# Patient Record
Sex: FEMALE | Race: WHITE | Employment: OTHER | URBAN - METROPOLITAN AREA
[De-identification: names, ages, dates, MRNs, and addresses within clinical notes are randomized per-mention and may not be internally consistent; named-entity substitution may affect disease eponyms.]

---

## 2017-03-30 ENCOUNTER — OFFICE VISIT (OUTPATIENT)
Dept: NEUROLOGY | Facility: CLINIC | Age: 42
End: 2017-03-30

## 2017-03-30 VITALS
WEIGHT: 146.2 LBS | SYSTOLIC BLOOD PRESSURE: 122 MMHG | BODY MASS INDEX: 25.91 KG/M2 | HEIGHT: 63 IN | HEART RATE: 82 BPM | DIASTOLIC BLOOD PRESSURE: 85 MMHG

## 2017-03-30 DIAGNOSIS — G40.909 RECURRENT SEIZURES (H): Primary | ICD-10-CM

## 2017-03-30 NOTE — MR AVS SNAPSHOT
After Visit Summary   3/30/2017    Mary Veliz    MRN: [id number]           Patient Information     Date Of Birth          1975        Visit Information        Provider Department      3/30/2017 10:00 AM Bill Munoz MD [facility]        Today's Diagnoses     Recurrent seizures (H)    -  1       Follow-ups after your visit        Follow-up notes from your care team     Return in about 1 year (around 3/30/2018).      Who to contact     Please call your clinic at [phone] to:    Ask questions about your health    Make or cancel appointments    Discuss your medicines    Learn about your test results    Speak to your doctor   If you have compliments or concerns about an experience at your clinic, or if you wish to file a complaint, please contact [facility] Patient Relations at [phone] or email us at [email or web address]         Additional Information About Your Visit        MyChart Information     [employer] is an electronic gateway that provides easy, online access to your medical records. With [employer], you can request a clinic appointment, read your test results, renew a prescription or communicate with your care team.     To sign up for [employer] visit the website at [email or web address]   You will be asked to enter the access code listed below, as well as some personal information. Please follow the directions to create your username and password.     Your access code is: [email or web address]  Expires: 2017 12:06 PM     Your access code will  in 90 days. If you need help or a new code, please contact your [facility] or call [phone] for assistance.        Care EveryWhere ID     This is your Care EveryWhere ID. This could be used by other organizations to access your [facility] medical records  [id number]        Your Vitals Were     Pulse Height Breastfeeding? BMI (Body Mass Index)          82 5'  "2.5\" (158.8 cm) No 26.31 kg/m2         Blood Pressure from Last 3 Encounters:   03/30/17 122/85   12/30/16 147/85   11/09/16 111/79    Weight from Last 3 Encounters:   03/30/17 146 lb 3.2 oz (66.3 kg)   12/30/16 146 lb (66.2 kg)              We Performed the Following     Levetiracetam level        Primary Care Provider Office Phone # Fax #    [facility] [phone]       [facility] [address] [place] [address]        Thank you!     Thank you for choosing [facility]  for your care. Our goal is always to provide you with excellent care. Hearing back from our patients is one way we can continue to improve our services. Please take a few minutes to complete the written survey that you may receive in the mail after your visit with us. Thank you!             Your Updated Medication List - Protect others around you: Learn how to safely use, store and throw away your medicines at www.disposemymeds.org.          This list is accurate as of: 3/30/17 12:06 PM.  Always use your most recent med list.                   Brand Name Dispense Instructions for use    BABY ASPIRIN PO      Take 1 capsule by mouth daily       HumaLOG KWIKpen 100 UNIT/ML injection   Generic drug:  insulin lispro      Inject  Subcutaneous 3 times daily (before meals).       insulin glargine 100 UNIT/ML injection    LANTUS     Inject  Subcutaneous At Bedtime.       levETIRAcetam 250 MG tablet    KEPPRA    60 tablet    Take 1 tablet (250 mg) by mouth 2 times daily       TYLENOL PO      Take by mouth as needed for mild pain or fever         "

## 2017-03-30 NOTE — PROGRESS NOTES
"INTERVAL HX: No more spells. Tolerating 250 bid of LEV well, though dizzy first few days.       HISTORY OF PRESENT ILLNESS:  The most recent spell occurred on 11/08.  She was in a store shopping with friends.  She stopped outside of the store and began to feel that she was talking in slow motion and that her friends were also talking in slow motion.  She sat down, feeling dizzy; friends drove her home.  When she arrived at home, she started having some unusual spells where she began to shake her left hand, and she demonstrated this in clinic as a rapid back and forth movement of the hand.  She then began to clap hands, arms were twisting.  She was fully aware of these events and she said \"I couldn't stop them and they scared me.\"  This went on and off for about an hour or so before the paramedics arrived.  She did check her glucose which was 452 at the time.  EMS gave her some sedation, but that did not appear to change her symptoms.  Do not know what the sedation was.  In the emergency room, she was noted to have a normal blood pressure and intermittent \"tremors of her extremities.\"  She was able to stop these when performing purposeful movements in the emergency room.  Ativan was given and she had no further events after that.  She returned home and did have vomiting and then tiredness for the next day or 2.      Emergency room or laboratories confirmed glucose that was elevated at 339.  CT of the brain without contrast was negative.      In exploring her history, she states that when she was approximately 27, she began to have spells approximately once every year or 2.  Often these began with nausea, dizziness and she sometimes ends up on the ground.  Two years ago she had an event where she ended up on the ground and did have urinary incontinence.      The spell she had on 11/08, however, was somewhat different in as much as she did not lose awareness.  There are no precipitating events for these.  These have " never been diagnosed as epileptic seizures, but Dr. Steinberg did raise that possibility.  There is no family history of epilepsy.      Mother confirmed birth weight of approximately 8 pounds 3 ounces.      Developmental was normal.  No febrile seizures.  No encephalitis or meningitis.  No severe head injuries.      She had a routine EEG at the [facility] that was done on 11/16/2016 as interpreted as borderline with presence of only asymmetric theta activity in the left temporal region of unclear clinical significance.      MRI scan on 11/23 at [facility] did not state the strength of the MRI machine, but was basically FLARE, DWI and other images and was read as subtle asymmetric decrease in the left hippocampal volume with associated intrinsic T2 prolongation, suspicious for left hippocampal sclerosis.  She is currently on no anti-seizure medication.      She had juvenile-onset diabetes at age 17, treated with Humalog and Lantus.  Her diabetes is very poorly controlled with she reports blood sugars as high as 700 without symptoms.  Apparently she has been deemed not a good candidate for an insulin pump, but [facility].      ALLERGIES:  Codeine which causes severe itching.      MEDICAL HISTORY:  Type 1 diabetes, onset age 17.      SURGERIES:  Included 2 C-sections with the birth of children, back surgery for spinal stenosis, tubal ligation, arthroscopic knee surgery, left knee.      FAMILY HISTORY:  Positive for cancer, heart disease in parents and hypertension and heart attacks in father.        Currently she is disabled due to cervical spinal stenosis, which has made it impossible for her to do any significant lifting, etc.      She previously did work in  in an office and in retail as well.      Two children ages 19 and 11.        She describes herself generally as a happy, pleasant person that, however, feels socially isolated.  Her  does  work.  She does go to [employer] on Sundays.  She has had a previous history of mild depression treated but currently does not describe any issues with depression.      REVIEW OF SYSTEMS:  Weakness of upper arms and wrists, spinal stenosis including paresthesias.      Currently not driving but often drives around the neighborhood a few blocks to go to [employer], shopping and visit parents.      Otherwise, cardiovascular, gastrointestinal and HEENT are negative.  She does have occasional anxiety and difficulty sleeping.      She is a smoker.      PHYSICAL EXAMINATION:   GENERAL:  On exam today, she is quite alert, speaks fluently and actually has a normal affect.   CHEST:  Examination of the lungs reveals some rales or rhonchi initially on deep breathing, which do clear.  NEUROLOGICAL EXAMINATION:  Memory is mildly impaired with 2 items out of 3 at 5 minutes.   CRANIAL NERVES:  Pupils are equal and react briskly.  Visual fields are grossly normal.  Facial movements are symmetrical.  Hearing is intact.  Tongue and palate move well.   CEREBELLAR EXAMINATION:  Finger-nose-finger is done without dysmetria.  Romberg is negative.  Tandem gait is done with only minimal incoordination.     MOTOR TONE AND STRENGTH:  Normal, though she does have some mild difficulty with hopping on either foot.     REFLEXES:  Deep tendon reflexes are hypoactive in the lower extremities.     SENSATION:  She does have loss of sensation to pin and sharp and dull.  Has decreased vibration sense, but position sense is grossly normal.      ASSESSMENT:  Electroencephalogram  done Nov 2016  shows relatively frequent sharp waves in the left temporal region, and support EEG from [facility]. Also MRI findings of L temp issues.   Will present at [facility] for 7 T MRI.   PLAN:  1)  LEV level  2) RTC 1 year

## 2017-03-30 NOTE — LETTER
"3/30/2017       RE: Mary Veliz  : 1975   MRN: [id number]      Dear Colleague,    Thank you for referring your patient, Mary Veliz, to the [facility] at [facility]. Please see a copy of my visit note below.    INTERVAL HX: No more spells. Tolerating 250 bid of LEV well, though dizzy first few days.       HISTORY OF PRESENT ILLNESS:  The most recent spell occurred on .  She was in a store shopping with friends.  She stopped outside of the store and began to feel that she was talking in slow motion and that her friends were also talking in slow motion.  She sat down, feeling dizzy; friends drove her home.  When she arrived at home, she started having some unusual spells where she began to shake her left hand, and she demonstrated this in clinic as a rapid back and forth movement of the hand.  She then began to clap hands, arms were twisting.  She was fully aware of these events and she said \"I couldn't stop them and they scared me.\"  This went on and off for about an hour or so before the paramedics arrived.  She did check her glucose which was 452 at the time.  EMS gave her some sedation, but that did not appear to change her symptoms.  Do not know what the sedation was.  In the emergency room, she was noted to have a normal blood pressure and intermittent \"tremors of her extremities.\"  She was able to stop these when performing purposeful movements in the emergency room.  Ativan was given and she had no further events after that.  She returned home and did have vomiting and then tiredness for the next day or 2.      Emergency room or laboratories confirmed glucose that was elevated at 339.  CT of the brain without contrast was negative.      In exploring her history, she states that when she was approximately 27, she began to have spells approximately once every year or 2.  Often these began with nausea, dizziness and she sometimes ends up on the " ground.  Two years ago she had an event where she ended up on the ground and did have urinary incontinence.      The spell she had on 11/08, however, was somewhat different in as much as she did not lose awareness.  There are no precipitating events for these.  These have never been diagnosed as epileptic seizures, but Dr. Steinberg did raise that possibility.  There is no family history of epilepsy.      Mother confirmed birth weight of approximately 8 pounds 3 ounces.      Developmental was normal.  No febrile seizures.  No encephalitis or meningitis.  No severe head injuries.      She had a routine EEG at the [facility] that was done on 11/16/2016 as interpreted as borderline with presence of only asymmetric theta activity in the left temporal region of unclear clinical significance.      MRI scan on 11/23 at [facility] did not state the strength of the MRI machine, but was basically FLARE, DWI and other images and was read as subtle asymmetric decrease in the left hippocampal volume with associated intrinsic T2 prolongation, suspicious for left hippocampal sclerosis.  She is currently on no anti-seizure medication.      She had juvenile-onset diabetes at age 17, treated with Humalog and Lantus.  Her diabetes is very poorly controlled with she reports blood sugars as high as 700 without symptoms.  Apparently she has been deemed not a good candidate for an insulin pump, but has been [facility] and [facility] by [facility].      ALLERGIES:  Codeine which causes severe itching.      MEDICAL HISTORY:  Type 1 diabetes, onset age 17.      SURGERIES:  Included 2 C-sections with the birth of children, back surgery for spinal stenosis, tubal ligation, arthroscopic knee surgery, left knee.      FAMILY HISTORY:  Positive for cancer, heart disease in parents and hypertension and heart attacks in father.        Currently she is disabled due to cervical spinal stenosis, which has made it impossible for her to  do any significant lifting, etc.      She previously did work in  in an office and in retail as well.      Two children ages 19 and 11.        She describes herself generally as a happy, pleasant person that, however, feels socially isolated.  Her  does work.  She does go to [employer] on Sundays.  She has had a previous history of mild depression treated but currently does not describe any issues with depression.      REVIEW OF SYSTEMS:  Weakness of upper arms and wrists, spinal stenosis including paresthesias.      Currently not driving but often drives around the neighborhood a few blocks to go to [employer], shopping and visit parents.      Otherwise, cardiovascular, gastrointestinal and HEENT are negative.  She does have occasional anxiety and difficulty sleeping.      She is a smoker.      PHYSICAL EXAMINATION:   GENERAL:  On exam today, she is quite alert, speaks fluently and actually has a normal affect.   CHEST:  Examination of the lungs reveals some rales or rhonchi initially on deep breathing, which do clear.  NEUROLOGICAL EXAMINATION:  Memory is mildly impaired with 2 items out of 3 at 5 minutes.   CRANIAL NERVES:  Pupils are equal and react briskly.  Visual fields are grossly normal.  Facial movements are symmetrical.  Hearing is intact.  Tongue and palate move well.   CEREBELLAR EXAMINATION:  Finger-nose-finger is done without dysmetria.  Romberg is negative.  Tandem gait is done with only minimal incoordination.     MOTOR TONE AND STRENGTH:  Normal, though she does have some mild difficulty with hopping on either foot.     REFLEXES:  Deep tendon reflexes are hypoactive in the lower extremities.     SENSATION:  She does have loss of sensation to pin and sharp and dull.  Has decreased vibration sense, but position sense is grossly normal.      ASSESSMENT:  Electroencephalogram  done Nov 2016  shows relatively frequent sharp waves in the left temporal region, and support EEG from [facility]. Also MRI findings of L temp issues.   Will present at [facility] for 7 T MRI.   PLAN:  1)  LEV level  2) RTC 1 year        Again, thank you for allowing me to participate in the care of your patient.      Sincerely,    Bill Munoz MD

## 2017-03-31 LAB — LEVETIRACETAM SERPL-MCNC: 8.8 UG/ML

## 2017-04-28 DIAGNOSIS — G40.909 RECURRENT SEIZURES (H): ICD-10-CM

## 2017-05-01 RX ORDER — LEVETIRACETAM 250 MG/1
TABLET ORAL
Qty: 60 TABLET | Refills: 11 | Status: SHIPPED | OUTPATIENT
Start: 2017-05-01 | End: 2018-02-23

## 2018-02-23 ENCOUNTER — OFFICE VISIT (OUTPATIENT)
Dept: NEUROLOGY | Facility: CLINIC | Age: 43
End: 2018-02-23
Payer: COMMERCIAL

## 2018-02-23 VITALS
SYSTOLIC BLOOD PRESSURE: 137 MMHG | HEIGHT: 63 IN | TEMPERATURE: 98.7 F | HEART RATE: 97 BPM | DIASTOLIC BLOOD PRESSURE: 83 MMHG | WEIGHT: 148 LBS | BODY MASS INDEX: 26.22 KG/M2

## 2018-02-23 DIAGNOSIS — G40.909 RECURRENT SEIZURES (H): ICD-10-CM

## 2018-02-23 ASSESSMENT — PAIN SCALES - GENERAL: PAINLEVEL: SEVERE PAIN (6)

## 2018-02-23 NOTE — LETTER
"2018       RE: Mary Veliz  : 1975   MRN: [id number]      Dear Colleague,    Thank you for referring your patient, Mary Veliz, to the [facility] at [facility]. Please see a copy of my visit note below.    INTERVAL HX: No more spells. Tolerating 250 bid of LEV well. Major issue continues to be diabetes, spinal stenosis, and dreary hoplessness due to medical and financial issues.   HISTORY OF PRESENT ILLNESS: REV 16. The most recent spell occurred on 16 She was in a store shopping with friends.  She stopped outside of the store and began to feel that she was talking in slow motion and that her friends were also talking in slow motion.  She sat down, feeling dizzy; friends drove her home.  When she arrived at home, she started having some unusual spells where she began to shake her left hand, and she demonstrated this in clinic as a rapid back and forth movement of the hand.  She then began to clap hands, arms were twisting.  She was fully aware of these events and she said \"I couldn't stop them and they scared me.\"  This went on and off for about an hour or so before the paramedics arrived.  She did check her glucose which was 452 at the time.  EMS gave her some sedation, but that did not appear to change her symptoms.    the emergency room, she was noted to have a normal blood pressure and intermittent \"tremors of her extremities.\"  She was able to stop these when performing purposeful movements in the emergency room.  Ativan was given and she had no further events after that.  She returned home and did have vomiting and then tiredness for the next day or 2.      Emergency room or laboratories confirmed glucose that was elevated at 339.  CT of the brain without contrast was negative.      In exploring her history, she states that when she was approximately 27, she began to have spells approximately once every year or 2.  Often these " began with nausea, dizziness and she sometimes ends up on the ground.  Two years ago she had an event where she ended up on the ground and did have urinary incontinence.      The spell she had on 11/08, however, was somewhat different in as much as she did not lose awareness.  There are no precipitating events for these.  These have never been diagnosed as epileptic seizures, but Dr. Steinberg did raise that possibility.  There is no family history of epilepsy.      Mother confirmed birth weight of approximately 8 pounds 3 ounces.      Developmental was normal.  No febrile seizures.  No encephalitis or meningitis.  No severe head injuries.      She had a routine EEG at the [facility] that was done on 11/16/2016 as interpreted as borderline with presence of only asymmetric theta activity in the left temporal region of unclear clinical significance.      MRI scan on 11/2316  at [facility] did not state the strength of the MRI machine, but was basically FLARE, DWI and other images and was read as subtle asymmetric decrease in the left hippocampal volume with associated intrinsic T2 prolongation, suspicious for left hippocampal sclerosis.  She is currently on no anti-seizure medication.      She had juvenile-onset diabetes at age 17, treated with Humalog and Lantus.  Her diabetes is very poorly controlled with she reports blood sugars as high as 700 without symptoms.  Apparently she has been deemed not a good candidate for an insulin pump, but has been [facility] and [facility] by [facility].      ALLERGIES:  Codeine which causes severe itching.      MEDICAL HISTORY:  Type 1 diabetes, onset age 17.      SURGERIES:  Included 2 C-sections with the birth of children, back surgery for spinal stenosis, tubal ligation, arthroscopic knee surgery, left knee.      FAMILY HISTORY:  Positive for cancer, heart disease in parents and hypertension and heart attacks in father.        Currently she is disabled due to  cervical spinal stenosis, which has made it impossible for her to do any significant lifting, etc.      She previously did work in  in an office and in retail as well.      Two children ages 19 and 11.        She describes herself generally as a happy, pleasant person that, however, feels socially isolated.  Her  does work.  She does go to [employer] on Sundays.  She has had a previous history of mild depression treated but currently does not describe any issues with depression.      REVIEW OF SYSTEMS:  Weakness of upper arms and wrists, spinal stenosis including paresthesias.      Currently not driving but often drives around the neighborhood a few blocks to go to [employer], shopping and visit parents.      Otherwise, cardiovascular, gastrointestinal and HEENT are negative.  She does have occasional anxiety and difficulty sleeping.      She is a smoker.      PHYSICAL EXAMINATION:   GENERAL:  On exam today, she is quite alert, speaks fluently and actually has a normal affect.   CHEST:  Examination of the lungs reveals some rales or rhonchi initially on deep breathing, which do clear.  NEUROLOGICAL EXAMINATION:  Memory is mildly impaired with 2 items out of 3 at 5 minutes.   CRANIAL NERVES:  Pupils are equal and react briskly.  Visual fields are grossly normal.  Facial movements are symmetrical.  Hearing is intact.  Tongue and palate move well.   CEREBELLAR EXAMINATION:  Finger-nose-finger is done without dysmetria.  Romberg is negative.  Tandem gait is done with only minimal incoordination.     MOTOR TONE AND STRENGTH:  Normal, though she does have some mild difficulty with hopping on either foot.     REFLEXES:  Deep tendon reflexes are hypoactive in the lower extremities.     SENSATION:  She does have loss of sensation to pin and sharp and dull.  Has decreased vibration sense, but position sense is grossly normal.      ASSESSMENT:  Electroencephalogram  done Nov 2016  shows relatively frequent sharp waves  in the left temporal region, and supports EEG from [facility]. Also MRI findings of L temp issues. DIagnosis is  Presumptive CP SZ but diff Dx includew diabetic events & PSNEs      PLAN:  1) Given phone # for [facility] transplant center  2) RTC 1 year      Sincerely,    Bill Munoz MD

## 2018-02-23 NOTE — PROGRESS NOTES
"INTERVAL HX: No more spells. Tolerating 250 bid of LEV well. Major issue continues to be diabetes, spinal stenosis, and dreary hoplessness due to medical and financial issues.   HISTORY OF PRESENT ILLNESS: REV 2/23/18. The most recent spell occurred on 11/08/16 She was in a store shopping with friends.  She stopped outside of the store and began to feel that she was talking in slow motion and that her friends were also talking in slow motion.  She sat down, feeling dizzy; friends drove her home.  When she arrived at home, she started having some unusual spells where she began to shake her left hand, and she demonstrated this in clinic as a rapid back and forth movement of the hand.  She then began to clap hands, arms were twisting.  She was fully aware of these events and she said \"I couldn't stop them and they scared me.\"  This went on and off for about an hour or so before the paramedics arrived.  She did check her glucose which was 452 at the time.  EMS gave her some sedation, but that did not appear to change her symptoms.    the emergency room, she was noted to have a normal blood pressure and intermittent \"tremors of her extremities.\"  She was able to stop these when performing purposeful movements in the emergency room.  Ativan was given and she had no further events after that.  She returned home and did have vomiting and then tiredness for the next day or 2.      Emergency room or laboratories confirmed glucose that was elevated at 339.  CT of the brain without contrast was negative.      In exploring her history, she states that when she was approximately 27, she began to have spells approximately once every year or 2.  Often these began with nausea, dizziness and she sometimes ends up on the ground.  Two years ago she had an event where she ended up on the ground and did have urinary incontinence.      The spell she had on 11/08, however, was somewhat different in as much as she did not lose awareness.  " There are no precipitating events for these.  These have never been diagnosed as epileptic seizures, but Dr. Steinberg did raise that possibility.  There is no family history of epilepsy.      Mother confirmed birth weight of approximately 8 pounds 3 ounces.      Developmental was normal.  No febrile seizures.  No encephalitis or meningitis.  No severe head injuries.      She had a routine EEG at the [facility] that was done on 11/16/2016 as interpreted as borderline with presence of only asymmetric theta activity in the left temporal region of unclear clinical significance.      MRI scan on 11/2316  at [facility] did not state the strength of the MRI machine, but was basically FLARE, DWI and other images and was read as subtle asymmetric decrease in the left hippocampal volume with associated intrinsic T2 prolongation, suspicious for left hippocampal sclerosis.  She is currently on no anti-seizure medication.      She had juvenile-onset diabetes at age 17, treated with Humalog and Lantus.  Her diabetes is very poorly controlled with she reports blood sugars as high as 700 without symptoms.  Apparently she has been deemed not a good candidate for an insulin pump, but has [facility].      ALLERGIES:  Codeine which causes severe itching.      MEDICAL HISTORY:  Type 1 diabetes, onset age 17.      SURGERIES:  Included 2 C-sections with the birth of children, back surgery for spinal stenosis, tubal ligation, arthroscopic knee surgery, left knee.      FAMILY HISTORY:  Positive for cancer, heart disease in parents and hypertension and heart attacks in father.        Currently she is disabled due to cervical spinal stenosis, which has made it impossible for her to do any significant lifting, etc.      She previously did work in  in an office and in retail as well.      Two children ages 19 and 11.        She describes herself generally as a happy, pleasant person  that, however, feels socially isolated.  Her  does work.  She does go to [employer] on Sundays.  She has had a previous history of mild depression treated but currently does not describe any issues with depression.      REVIEW OF SYSTEMS:  Weakness of upper arms and wrists, spinal stenosis including paresthesias.      Currently not driving but often drives around the neighborhood a few blocks to go to [employer], shopping and visit parents.      Otherwise, cardiovascular, gastrointestinal and HEENT are negative.  She does have occasional anxiety and difficulty sleeping.      She is a smoker.      PHYSICAL EXAMINATION:   GENERAL:  On exam today, she is quite alert, speaks fluently and actually has a normal affect.   CHEST:  Examination of the lungs reveals some rales or rhonchi initially on deep breathing, which do clear.  NEUROLOGICAL EXAMINATION:  Memory is mildly impaired with 2 items out of 3 at 5 minutes.   CRANIAL NERVES:  Pupils are equal and react briskly.  Visual fields are grossly normal.  Facial movements are symmetrical.  Hearing is intact.  Tongue and palate move well.   CEREBELLAR EXAMINATION:  Finger-nose-finger is done without dysmetria.  Romberg is negative.  Tandem gait is done with only minimal incoordination.     MOTOR TONE AND STRENGTH:  Normal, though she does have some mild difficulty with hopping on either foot.     REFLEXES:  Deep tendon reflexes are hypoactive in the lower extremities.     SENSATION:  She does have loss of sensation to pin and sharp and dull.  Has decreased vibration sense, but position sense is grossly normal.      ASSESSMENT:  Electroencephalogram  done Nov 2016  shows relatively frequent sharp waves in the left temporal region, and supports EEG from [facility]. Also MRI findings of L temp issues. DIagnosis is  Presumptive CP SZ but diff Dx includew diabetic events & PSNEs      PLAN:  1) Given phone # for [facility]  2) RTC 1 year

## 2018-02-23 NOTE — MR AVS SNAPSHOT
"              After Visit Summary   2018    Mary Veliz    MRN: [id number]           Patient Information     Date Of Birth          1975        Visit Information        Provider Department      2018 2:00 PM Bill Munoz MD [facility]        Today's Diagnoses     Recurrent seizures (H)           Follow-ups after your visit        Follow-up notes from your care team     Return in about 1 year (around 2019).      Who to contact     Please call your clinic at [phone] to:    Ask questions about your health    Make or cancel appointments    Discuss your medicines    Learn about your test results    Speak to your doctor            Additional Information About Your Visit        MyChart Information     [employer] is an electronic gateway that provides easy, online access to your medical records. With [employer], you can request a clinic appointment, read your test results, renew a prescription or communicate with your care team.     To sign up for [employer] visit the website at [email or web address]   You will be asked to enter the access code listed below, as well as some personal information. Please follow the directions to create your username and password.     Your access code is: [email or web address]  Expires: 2018  1:56 PM     Your access code will  in 90 days. If you need help or a new code, please contact your [facility] or call [phone] for assistance.        Care EveryWhere ID     This is your Care EveryWhere ID. This could be used by other organizations to access your [facility] medical records  [id number]        Your Vitals Were     Pulse Temperature Height Last Period BMI (Body Mass Index)       97 98.7  F (37.1  C) 5' 3\" (160 cm) 2018 (Approximate) 26.22 kg/m2        Blood Pressure from Last 3 Encounters:   18 137/83   17 122/85   16 147/85    Weight from Last 3 Encounters:   18 148 lb (67.1 kg)   17 " 146 lb 3.2 oz (66.3 kg)   12/30/16 146 lb (66.2 kg)              Today, you had the following     No orders found for display         Today's Medication Changes          These changes are accurate as of 2/23/18 11:59 PM.  If you have any questions, ask your nurse or doctor.               These medicines have changed or have updated prescriptions.        Dose/Directions    CRUZITO MEZA SC   This may have changed:  Another medication with the same name was removed. Continue taking this medication, and follow the directions you see here.   Changed by:  Bill Munoz MD        Dose:  20 Units   Inject 20 Units Subcutaneous At Bedtime   Refills:  0       levETIRAcetam 250 MG tablet   Commonly known as:  KEPPRA   This may have changed:  See the new instructions.   Used for:  Recurrent seizures (H)   Changed by:  Bill Munoz MD        TAKE 1 TABLET(250 MG) BY MOUTH TWICE DAILY   Quantity:  60 tablet   Refills:  11            Where to get your medicines      These medications were sent to [facility] [address] [place] [address]  [address]  [address]  [address], [facility] [address]     Phone:  [phone]     levETIRAcetam 250 MG tablet                Primary Care Provider Office Phone # Fax #    More Garcia [facility] [phone]       [facility] [address] [address]        Equal Access to Services     [facility] AH: Hadii aad ku hadasho Soomaali, waaxda luqadaha, qaybta kaalmada adeegyada, waxay idiin hayaan ademaged kharachantell avendano . So [facility] [phone].    ATENCIÓN: Si habla español, tiene a alfaro disposición servicios gratuitos de asistencia lingüística. Edna al [phone].    We comply with applicable federal civil rights laws and [place] laws. We do not discriminate on the basis of race, color, national origin, age, disability, sex, sexual orientation, or gender identity.            Thank you!     Thank you for choosing  [facility]  for your care. Our goal is always to provide you with excellent care. Hearing back from our patients is one way we can continue to improve our services. Please take a few minutes to complete the written survey that you may receive in the mail after your visit with us. Thank you!             Your Updated Medication List - Protect others around you: Learn how to safely use, store and throw away your medicines at www.disposemymeds.org.          This list is accurate as of 2/23/18 11:59 PM.  Always use your most recent med list.                   Brand Name Dispense Instructions for use Diagnosis    BABY ASPIRIN PO      Take 1 capsule by mouth daily        BASAGLAR KWIKPEN SC      Inject 20 Units Subcutaneous At Bedtime        HumaLOG KWIKpen 100 UNIT/ML injection   Generic drug:  insulin lispro      Inject Subcutaneous 4 times daily (with meals and nightly)        levETIRAcetam 250 MG tablet    KEPPRA    60 tablet    TAKE 1 TABLET(250 MG) BY MOUTH TWICE DAILY    Recurrent seizures (H)       TYLENOL PO      Take by mouth as needed for mild pain or fever

## 2018-02-24 ASSESSMENT — PATIENT HEALTH QUESTIONNAIRE - PHQ9: SUM OF ALL RESPONSES TO PHQ QUESTIONS 1-9: 18

## 2018-03-02 RX ORDER — LEVETIRACETAM 250 MG/1
TABLET ORAL
Qty: 60 TABLET | Refills: 11 | Status: SHIPPED | OUTPATIENT
Start: 2018-03-02 | End: 2019-03-27

## 2018-04-18 ENCOUNTER — TELEPHONE (OUTPATIENT)
Dept: NEUROLOGY | Facility: CLINIC | Age: 43
End: 2018-04-18

## 2018-04-18 NOTE — TELEPHONE ENCOUNTER
"Nurse received call from Patient who is requesting a \"OK\" from MD for PCP to Prescribe Zyban (PCP request ).  Nurse look at available material found no counter indication, but indicated he would check with Neurologist.  Patient's only AED is Lev 250 BID.  "

## 2018-04-26 DIAGNOSIS — G40.909 RECURRENT SEIZURES (H): ICD-10-CM

## 2018-04-26 RX ORDER — LEVETIRACETAM 250 MG/1
TABLET ORAL
Qty: 60 TABLET | Refills: 0 | OUTPATIENT
Start: 2018-04-26

## 2018-04-30 NOTE — TELEPHONE ENCOUNTER
Received in - bnasket reply from Dr. Munoz as follows:    ok     Nurse returned call to Patient, left detailed voice mail with call back number and name.

## 2018-07-04 ENCOUNTER — APPOINTMENT (OUTPATIENT)
Dept: GENERAL RADIOLOGY | Facility: CLINIC | Age: 43
DRG: 639 | End: 2018-07-04
Attending: EMERGENCY MEDICINE
Payer: COMMERCIAL

## 2018-07-04 ENCOUNTER — HOSPITAL ENCOUNTER (INPATIENT)
Facility: CLINIC | Age: 43
LOS: 2 days | Discharge: HOME | DRG: 639 | End: 2018-07-06
Attending: EMERGENCY MEDICINE | Admitting: INTERNAL MEDICINE
Payer: COMMERCIAL

## 2018-07-04 DIAGNOSIS — E10.10 DKA, TYPE 1 (H): ICD-10-CM

## 2018-07-04 DIAGNOSIS — E10.10 DIABETIC KETOACIDOSIS WITHOUT COMA ASSOCIATED WITH TYPE 1 DIABETES MELLITUS (H): Primary | ICD-10-CM

## 2018-07-04 LAB
ALBUMIN UR-MCNC: NEGATIVE MG/DL
ANION GAP SERPL CALCULATED.3IONS-SCNC: 12 MMOL/L (ref 3–14)
ANION GAP SERPL CALCULATED.3IONS-SCNC: 20 MMOL/L (ref 3–14)
APPEARANCE UR: CLEAR
BACTERIA #/AREA URNS HPF: ABNORMAL /HPF
BASE DEFICIT BLDV-SCNC: 10.8 MMOL/L
BASOPHILS # BLD AUTO: 0.1 10E9/L (ref 0–0.2)
BASOPHILS NFR BLD AUTO: 0.6 %
BILIRUB UR QL STRIP: NEGATIVE
BUN SERPL-MCNC: 13 MG/DL (ref 7–30)
BUN SERPL-MCNC: 19 MG/DL (ref 7–30)
CALCIUM SERPL-MCNC: 8 MG/DL (ref 8.5–10.1)
CALCIUM SERPL-MCNC: 9.2 MG/DL (ref 8.5–10.1)
CHLORIDE SERPL-SCNC: 106 MMOL/L (ref 94–109)
CHLORIDE SERPL-SCNC: 98 MMOL/L (ref 94–109)
CO2 SERPL-SCNC: 14 MMOL/L (ref 20–32)
CO2 SERPL-SCNC: 18 MMOL/L (ref 20–32)
COLOR UR AUTO: ABNORMAL
CREAT SERPL-MCNC: 0.58 MG/DL (ref 0.52–1.04)
CREAT SERPL-MCNC: 0.79 MG/DL (ref 0.52–1.04)
DIFFERENTIAL METHOD BLD: ABNORMAL
EOSINOPHIL # BLD AUTO: 0.1 10E9/L (ref 0–0.7)
EOSINOPHIL NFR BLD AUTO: 0.3 %
ERYTHROCYTE [DISTWIDTH] IN BLOOD BY AUTOMATED COUNT: 12.7 % (ref 10–15)
GFR SERPL CREATININE-BSD FRML MDRD: 79 ML/MIN/1.7M2
GFR SERPL CREATININE-BSD FRML MDRD: >90 ML/MIN/1.7M2
GLUCOSE BLDC GLUCOMTR-MCNC: 157 MG/DL (ref 70–99)
GLUCOSE BLDC GLUCOMTR-MCNC: 161 MG/DL (ref 70–99)
GLUCOSE BLDC GLUCOMTR-MCNC: 164 MG/DL (ref 70–99)
GLUCOSE BLDC GLUCOMTR-MCNC: 170 MG/DL (ref 70–99)
GLUCOSE BLDC GLUCOMTR-MCNC: 210 MG/DL (ref 70–99)
GLUCOSE BLDC GLUCOMTR-MCNC: 215 MG/DL (ref 70–99)
GLUCOSE BLDC GLUCOMTR-MCNC: 221 MG/DL (ref 70–99)
GLUCOSE BLDC GLUCOMTR-MCNC: 225 MG/DL (ref 70–99)
GLUCOSE BLDC GLUCOMTR-MCNC: 233 MG/DL (ref 70–99)
GLUCOSE BLDC GLUCOMTR-MCNC: 250 MG/DL (ref 70–99)
GLUCOSE BLDC GLUCOMTR-MCNC: 278 MG/DL (ref 70–99)
GLUCOSE BLDC GLUCOMTR-MCNC: 404 MG/DL (ref 70–99)
GLUCOSE BLDC GLUCOMTR-MCNC: 49 MG/DL (ref 70–99)
GLUCOSE SERPL-MCNC: 233 MG/DL (ref 70–99)
GLUCOSE SERPL-MCNC: 416 MG/DL (ref 70–99)
GLUCOSE UR STRIP-MCNC: >499 MG/DL
HBA1C MFR BLD: 8.9 % (ref 0–5.6)
HCO3 BLDV-SCNC: 15 MMOL/L (ref 21–28)
HCT VFR BLD AUTO: 42.8 % (ref 35–47)
HGB BLD-MCNC: 14.3 G/DL (ref 11.7–15.7)
HGB UR QL STRIP: ABNORMAL
IMM GRANULOCYTES # BLD: 0.1 10E9/L (ref 0–0.4)
IMM GRANULOCYTES NFR BLD: 0.6 %
KETONES BLD-SCNC: 3.4 MMOL/L (ref 0–0.6)
KETONES BLD-SCNC: 4.7 MMOL/L (ref 0–0.6)
KETONES UR STRIP-MCNC: 80 MG/DL
LACTATE BLD-SCNC: 3.8 MMOL/L (ref 0.7–2)
LACTATE SERPL-SCNC: 1 MMOL/L (ref 0.4–2)
LEUKOCYTE ESTERASE UR QL STRIP: NEGATIVE
LYMPHOCYTES # BLD AUTO: 1.7 10E9/L (ref 0.8–5.3)
LYMPHOCYTES NFR BLD AUTO: 8.9 %
MAGNESIUM SERPL-MCNC: 1.9 MG/DL (ref 1.6–2.3)
MCH RBC QN AUTO: 30.9 PG (ref 26.5–33)
MCHC RBC AUTO-ENTMCNC: 33.4 G/DL (ref 31.5–36.5)
MCV RBC AUTO: 92 FL (ref 78–100)
MONOCYTES # BLD AUTO: 1 10E9/L (ref 0–1.3)
MONOCYTES NFR BLD AUTO: 5 %
MRSA DNA SPEC QL NAA+PROBE: NEGATIVE
MUCOUS THREADS #/AREA URNS LPF: PRESENT /LPF
NEUTROPHILS # BLD AUTO: 16.1 10E9/L (ref 1.6–8.3)
NEUTROPHILS NFR BLD AUTO: 84.6 %
NITRATE UR QL: NEGATIVE
NRBC # BLD AUTO: 0 10*3/UL
NRBC BLD AUTO-RTO: 0 /100
O2/TOTAL GAS SETTING VFR VENT: ABNORMAL %
OXYHGB MFR BLDV: 53 %
PCO2 BLDV: 33 MM HG (ref 40–50)
PH BLDV: 7.27 PH (ref 7.32–7.43)
PH UR STRIP: 5 PH (ref 5–7)
PLATELET # BLD AUTO: 292 10E9/L (ref 150–450)
PO2 BLDV: 30 MM HG (ref 25–47)
POTASSIUM SERPL-SCNC: 3.8 MMOL/L (ref 3.4–5.3)
POTASSIUM SERPL-SCNC: 3.9 MMOL/L (ref 3.4–5.3)
RBC # BLD AUTO: 4.63 10E12/L (ref 3.8–5.2)
RBC #/AREA URNS AUTO: 1 /HPF (ref 0–2)
SODIUM SERPL-SCNC: 132 MMOL/L (ref 133–144)
SODIUM SERPL-SCNC: 136 MMOL/L (ref 133–144)
SOURCE: ABNORMAL
SP GR UR STRIP: 1.03 (ref 1–1.03)
SPECIMEN SOURCE: NORMAL
SQUAMOUS #/AREA URNS AUTO: 1 /HPF (ref 0–1)
TROPONIN I SERPL-MCNC: <0.015 UG/L (ref 0–0.04)
TROPONIN I SERPL-MCNC: <0.015 UG/L (ref 0–0.04)
UROBILINOGEN UR STRIP-MCNC: 0 MG/DL (ref 0–2)
WBC # BLD AUTO: 19 10E9/L (ref 4–11)
WBC #/AREA URNS AUTO: 1 /HPF (ref 0–5)

## 2018-07-04 PROCEDURE — 87641 MR-STAPH DNA AMP PROBE: CPT | Performed by: INTERNAL MEDICINE

## 2018-07-04 PROCEDURE — 84484 ASSAY OF TROPONIN QUANT: CPT | Performed by: EMERGENCY MEDICINE

## 2018-07-04 PROCEDURE — 81001 URINALYSIS AUTO W/SCOPE: CPT | Performed by: EMERGENCY MEDICINE

## 2018-07-04 PROCEDURE — 00000146 ZZHCL STATISTIC GLUCOSE BY METER IP

## 2018-07-04 PROCEDURE — 80048 BASIC METABOLIC PNL TOTAL CA: CPT | Performed by: INTERNAL MEDICINE

## 2018-07-04 PROCEDURE — 25000131 ZZH RX MED GY IP 250 OP 636 PS 637: Performed by: EMERGENCY MEDICINE

## 2018-07-04 PROCEDURE — 20000003 ZZH R&B ICU

## 2018-07-04 PROCEDURE — 96368 THER/DIAG CONCURRENT INF: CPT

## 2018-07-04 PROCEDURE — 82805 BLOOD GASES W/O2 SATURATION: CPT | Performed by: EMERGENCY MEDICINE

## 2018-07-04 PROCEDURE — 83036 HEMOGLOBIN GLYCOSYLATED A1C: CPT | Performed by: EMERGENCY MEDICINE

## 2018-07-04 PROCEDURE — 82010 KETONE BODYS QUAN: CPT | Performed by: EMERGENCY MEDICINE

## 2018-07-04 PROCEDURE — 36415 COLL VENOUS BLD VENIPUNCTURE: CPT | Performed by: INTERNAL MEDICINE

## 2018-07-04 PROCEDURE — 99285 EMERGENCY DEPT VISIT HI MDM: CPT | Mod: 25

## 2018-07-04 PROCEDURE — 83605 ASSAY OF LACTIC ACID: CPT | Performed by: EMERGENCY MEDICINE

## 2018-07-04 PROCEDURE — 82010 KETONE BODYS QUAN: CPT | Performed by: INTERNAL MEDICINE

## 2018-07-04 PROCEDURE — 93005 ELECTROCARDIOGRAM TRACING: CPT

## 2018-07-04 PROCEDURE — 25000132 ZZH RX MED GY IP 250 OP 250 PS 637: Performed by: INTERNAL MEDICINE

## 2018-07-04 PROCEDURE — 85025 COMPLETE CBC W/AUTO DIFF WBC: CPT | Performed by: EMERGENCY MEDICINE

## 2018-07-04 PROCEDURE — 96375 TX/PRO/DX INJ NEW DRUG ADDON: CPT

## 2018-07-04 PROCEDURE — 87640 STAPH A DNA AMP PROBE: CPT | Performed by: INTERNAL MEDICINE

## 2018-07-04 PROCEDURE — 25800025 ZZH RX 258: Performed by: EMERGENCY MEDICINE

## 2018-07-04 PROCEDURE — 87040 BLOOD CULTURE FOR BACTERIA: CPT | Performed by: EMERGENCY MEDICINE

## 2018-07-04 PROCEDURE — 25800025 ZZH RX 258: Performed by: INTERNAL MEDICINE

## 2018-07-04 PROCEDURE — 96366 THER/PROPH/DIAG IV INF ADDON: CPT

## 2018-07-04 PROCEDURE — 25000128 H RX IP 250 OP 636: Performed by: EMERGENCY MEDICINE

## 2018-07-04 PROCEDURE — 99223 1ST HOSP IP/OBS HIGH 75: CPT | Mod: AI | Performed by: INTERNAL MEDICINE

## 2018-07-04 PROCEDURE — 83735 ASSAY OF MAGNESIUM: CPT | Performed by: INTERNAL MEDICINE

## 2018-07-04 PROCEDURE — 84484 ASSAY OF TROPONIN QUANT: CPT | Performed by: INTERNAL MEDICINE

## 2018-07-04 PROCEDURE — 25000131 ZZH RX MED GY IP 250 OP 636 PS 637: Performed by: INTERNAL MEDICINE

## 2018-07-04 PROCEDURE — 96365 THER/PROPH/DIAG IV INF INIT: CPT

## 2018-07-04 PROCEDURE — 71046 X-RAY EXAM CHEST 2 VIEWS: CPT

## 2018-07-04 PROCEDURE — 80048 BASIC METABOLIC PNL TOTAL CA: CPT | Performed by: EMERGENCY MEDICINE

## 2018-07-04 RX ORDER — NALOXONE HYDROCHLORIDE 0.4 MG/ML
.1-.4 INJECTION, SOLUTION INTRAMUSCULAR; INTRAVENOUS; SUBCUTANEOUS
Status: DISCONTINUED | OUTPATIENT
Start: 2018-07-04 | End: 2018-07-06 | Stop reason: HOSPADM

## 2018-07-04 RX ORDER — AMOXICILLIN 500 MG/1
500 CAPSULE ORAL 3 TIMES DAILY
Status: DISCONTINUED | OUTPATIENT
Start: 2018-07-04 | End: 2018-07-06 | Stop reason: HOSPADM

## 2018-07-04 RX ORDER — POTASSIUM CL/LIDO/0.9 % NACL 10MEQ/0.1L
10 INTRAVENOUS SOLUTION, PIGGYBACK (ML) INTRAVENOUS
Status: DISCONTINUED | OUTPATIENT
Start: 2018-07-04 | End: 2018-07-06 | Stop reason: HOSPADM

## 2018-07-04 RX ORDER — POTASSIUM CHLORIDE 1500 MG/1
20-40 TABLET, EXTENDED RELEASE ORAL
Status: DISCONTINUED | OUTPATIENT
Start: 2018-07-04 | End: 2018-07-06 | Stop reason: HOSPADM

## 2018-07-04 RX ORDER — LEVETIRACETAM 250 MG/1
250 TABLET ORAL 2 TIMES DAILY
Status: DISCONTINUED | OUTPATIENT
Start: 2018-07-04 | End: 2018-07-06 | Stop reason: HOSPADM

## 2018-07-04 RX ORDER — PROCHLORPERAZINE 25 MG
25 SUPPOSITORY, RECTAL RECTAL EVERY 12 HOURS PRN
Status: DISCONTINUED | OUTPATIENT
Start: 2018-07-04 | End: 2018-07-06 | Stop reason: HOSPADM

## 2018-07-04 RX ORDER — SODIUM CHLORIDE 9 MG/ML
1000 INJECTION, SOLUTION INTRAVENOUS CONTINUOUS
Status: DISCONTINUED | OUTPATIENT
Start: 2018-07-04 | End: 2018-07-04

## 2018-07-04 RX ORDER — ACETAMINOPHEN 500 MG
1000 TABLET ORAL EVERY 6 HOURS PRN
Status: DISCONTINUED | OUTPATIENT
Start: 2018-07-04 | End: 2018-07-06 | Stop reason: HOSPADM

## 2018-07-04 RX ORDER — DEXTROSE MONOHYDRATE 25 G/50ML
25-50 INJECTION, SOLUTION INTRAVENOUS
Status: DISCONTINUED | OUTPATIENT
Start: 2018-07-04 | End: 2018-07-06 | Stop reason: HOSPADM

## 2018-07-04 RX ORDER — MAGNESIUM SULFATE HEPTAHYDRATE 40 MG/ML
4 INJECTION, SOLUTION INTRAVENOUS EVERY 4 HOURS PRN
Status: DISCONTINUED | OUTPATIENT
Start: 2018-07-04 | End: 2018-07-06 | Stop reason: HOSPADM

## 2018-07-04 RX ORDER — POTASSIUM CHLORIDE 7.45 MG/ML
10 INJECTION INTRAVENOUS
Status: DISCONTINUED | OUTPATIENT
Start: 2018-07-04 | End: 2018-07-06 | Stop reason: HOSPADM

## 2018-07-04 RX ORDER — POTASSIUM CHLORIDE 1.5 G/1.58G
20-40 POWDER, FOR SOLUTION ORAL
Status: DISCONTINUED | OUTPATIENT
Start: 2018-07-04 | End: 2018-07-06 | Stop reason: HOSPADM

## 2018-07-04 RX ORDER — POTASSIUM CHLORIDE 29.8 MG/ML
20 INJECTION INTRAVENOUS
Status: DISCONTINUED | OUTPATIENT
Start: 2018-07-04 | End: 2018-07-06 | Stop reason: HOSPADM

## 2018-07-04 RX ORDER — ONDANSETRON 2 MG/ML
4 INJECTION INTRAMUSCULAR; INTRAVENOUS EVERY 6 HOURS PRN
Status: DISCONTINUED | OUTPATIENT
Start: 2018-07-04 | End: 2018-07-06 | Stop reason: HOSPADM

## 2018-07-04 RX ORDER — ONDANSETRON 2 MG/ML
4 INJECTION INTRAMUSCULAR; INTRAVENOUS EVERY 30 MIN PRN
Status: DISCONTINUED | OUTPATIENT
Start: 2018-07-04 | End: 2018-07-04

## 2018-07-04 RX ORDER — ONDANSETRON 4 MG/1
4 TABLET, ORALLY DISINTEGRATING ORAL EVERY 6 HOURS PRN
Status: DISCONTINUED | OUTPATIENT
Start: 2018-07-04 | End: 2018-07-06 | Stop reason: HOSPADM

## 2018-07-04 RX ORDER — NICOTINE POLACRILEX 4 MG
15-30 LOZENGE BUCCAL
Status: DISCONTINUED | OUTPATIENT
Start: 2018-07-04 | End: 2018-07-06 | Stop reason: HOSPADM

## 2018-07-04 RX ORDER — POTASSIUM CL/LIDO/0.9 % NACL 10MEQ/0.1L
10 INTRAVENOUS SOLUTION, PIGGYBACK (ML) INTRAVENOUS ONCE
Status: COMPLETED | OUTPATIENT
Start: 2018-07-04 | End: 2018-07-04

## 2018-07-04 RX ORDER — DEXTROSE MONOHYDRATE 25 G/50ML
25-50 INJECTION, SOLUTION INTRAVENOUS
Status: DISCONTINUED | OUTPATIENT
Start: 2018-07-04 | End: 2018-07-06

## 2018-07-04 RX ORDER — PROCHLORPERAZINE MALEATE 5 MG
10 TABLET ORAL EVERY 6 HOURS PRN
Status: DISCONTINUED | OUTPATIENT
Start: 2018-07-04 | End: 2018-07-06 | Stop reason: HOSPADM

## 2018-07-04 RX ORDER — BISACODYL 10 MG
10 SUPPOSITORY, RECTAL RECTAL DAILY PRN
Status: DISCONTINUED | OUTPATIENT
Start: 2018-07-04 | End: 2018-07-06 | Stop reason: HOSPADM

## 2018-07-04 RX ORDER — SODIUM CHLORIDE 9 MG/ML
INJECTION, SOLUTION INTRAVENOUS CONTINUOUS
Status: DISCONTINUED | OUTPATIENT
Start: 2018-07-04 | End: 2018-07-04

## 2018-07-04 RX ADMIN — Medication 10 MEQ: at 11:48

## 2018-07-04 RX ADMIN — SODIUM CHLORIDE 1000 ML: 9 INJECTION, SOLUTION INTRAVENOUS at 12:40

## 2018-07-04 RX ADMIN — SODIUM CHLORIDE 7 UNITS/HR: 9 INJECTION, SOLUTION INTRAVENOUS at 21:33

## 2018-07-04 RX ADMIN — DEXTROSE AND SODIUM CHLORIDE 1000 ML: 5; 450 INJECTION, SOLUTION INTRAVENOUS at 15:39

## 2018-07-04 RX ADMIN — DEXTROSE 15 G: 15 GEL ORAL at 23:09

## 2018-07-04 RX ADMIN — AMOXICILLIN 500 MG: 500 CAPSULE ORAL at 18:04

## 2018-07-04 RX ADMIN — ONDANSETRON 4 MG: 2 INJECTION INTRAMUSCULAR; INTRAVENOUS at 10:43

## 2018-07-04 RX ADMIN — DEXTROSE AND SODIUM CHLORIDE 1000 ML: 5; 450 INJECTION, SOLUTION INTRAVENOUS at 23:42

## 2018-07-04 RX ADMIN — SODIUM CHLORIDE 1000 ML: 9 INJECTION, SOLUTION INTRAVENOUS at 10:42

## 2018-07-04 RX ADMIN — SODIUM CHLORIDE 2 UNITS/HR: 9 INJECTION, SOLUTION INTRAVENOUS at 12:38

## 2018-07-04 RX ADMIN — AMOXICILLIN 500 MG: 500 CAPSULE ORAL at 21:36

## 2018-07-04 RX ADMIN — DEXTROSE AND SODIUM CHLORIDE 1000 ML: 5; 450 INJECTION, SOLUTION INTRAVENOUS at 19:45

## 2018-07-04 RX ADMIN — LEVETIRACETAM 250 MG: 250 TABLET, FILM COATED ORAL at 21:35

## 2018-07-04 RX ADMIN — DEXTROSE MONOHYDRATE 25 ML: 25 INJECTION, SOLUTION INTRAVENOUS at 23:28

## 2018-07-04 RX ADMIN — INSULIN GLARGINE 20 UNITS: 100 INJECTION, SOLUTION SUBCUTANEOUS at 21:36

## 2018-07-04 ASSESSMENT — ENCOUNTER SYMPTOMS
VOMITING: 1
ABDOMINAL PAIN: 0
DYSURIA: 0
FATIGUE: 1
DIFFICULTY URINATING: 0
FEVER: 0
COUGH: 0
HEMATURIA: 0
SHORTNESS OF BREATH: 1
NAUSEA: 1
DIARRHEA: 0
BLOOD IN STOOL: 0

## 2018-07-04 NOTE — H&P
"History and Physical     Mary Veliz MRN# [id number]   YOB: 1975 Age: 43 year old      Date of Admission:  7/4/2018    Primary care provider: More Garcia          Assessment and Plan:   This patient is a 43 year old female with PMH of T1 dm under brittle control, \"spells\" thought to be due to sz on leviterecetam, hx of PUD, MDD  who presents to the ED with n/v, and severe CP that she felt was her heart and found to have DKA    CP:  This was her most concerning symptom, she thought she was having a heart attack.  Initial EKG and trop reassuring.  She continues to have waxing/waning chest pain but no more sob/diaphoresis.  She's high risk for early CAD in light of [id number]-bmuwua troponins-if negative needs stress testing.  Would stress tomorrow if off Insulin gtt.  On baby asa at baseline.  Check flp      DKA:  Cont with insulin gtt until gap closed, she's been having chills and sweats as well and a tooth ache and I suspect that was her trigger    Toothache - she does have quite a bit of gum swelling - so definite concern for infection (? Abscess) present for about a week.  BCx obtained, empiric amoxicillin and she'll need dentistry at discharge-likely needs the tooth pulled.     T1dm:  Home regimen is glargine 20 u at bedtime, lispro 4 u tid with meals-continue glargine, will hold short acting insulin as not sure she'll be eating decent meals, monitor with ISS    Leukocytosis:  Suspect tooth issue and stress related to admission/chest pain/dka    Hx of spells thought due to sz disorder:  Cont levetiacetam as at baseline      PPX:PCDs  Code:Full  Dispo:admit IP for expected LOS> 2 [place]               Chief Complaint:   Chest Pain        History of Present Illness:   This patient is a 43 year old female with PMH of T1 dm under brittle control, \"spells\" thought to be due to sz on leviterecetam, hx of PUD, MDD  who presents with Chest Pain.     She states she's been having nausea with intermittent " vomiting that began yesterday morning.  She was also feeling pretty sleepy and slept most of the day.  She had minimal po intake and was awake in the evening and then went back to bed last night.  Woke up 4 am, 6 am, 8 am with nausea and vomiting, mostly of clear liquid, no blood.  She had no associated diarrhea or abdominal pain. Blood sugars were in the 400 range (which happens for her) despite taking her insulin as per usual.      In the ER labs remarkable for e/o DKA.    Denies any fevers, notes chills and sweats today, n/v as described above, no NC/EA/ST/diarrhea/skin lesions, chronically fused neck so always has limited rom but no change in that.  Has noticed left upper tooth pain for about a week with associated gum swelling.     Around 8 am this morning after last n/v episode she had severe anterior band like chest pressure from axilla to axilla with associated sob and diaphoresis.  This is what really brought her to the ER as she thought she was having a heart attack.  Her symptoms have waxed and waned throughout the day but are dramatically improved.      EKG is without concerning findings and initial trop undetectable.    ER labs: also notable for elevated lactate 3.8 that resolved with IVF, VBG showing mild acidosis with some respiratory compensation, leukocytosis with neutrophilia, neg UA/CXR      The history is obtained in discussion with the ER provider Dr Menchaca and the patient with good reliability         Past Medical History:     Past Medical History:   Diagnosis Date     Brachial neuritis      MDD (major depressive disorder)      Peptic ulcer disease      Peripheral neuropathy      Seizure disorder (H)      Type 1 diabetes mellitus (H)              Past Surgical History:     Past Surgical History:   Procedure Laterality Date     ARTHROSCOPY KNEE       TONSILLECTOMY & ADENOIDECTOMY       TUBAL LIGATION               Social History:     Social History   Substance Use Topics     Smoking status:  Current Every Day Smoker     Packs/day: 1.00     Types: Cigarettes     Smokeless tobacco: Never Used     Alcohol use No   Lives with her  and dtr, disabled due to prior neck surgeries          Family History:     Family History   Problem Relation Age of Onset     Breast Cancer Mother             Allergies:     Allergies   Allergen Reactions     Codeine Sulfate              Medications:     Prior to Admission medications    Medication Sig Last Dose Taking? Auth Provider   Acetaminophen (TYLENOL PO) Take 1,000 mg by mouth every 6 hours as needed for mild pain or fever  7/3/2018 at Unknown time Yes Reported, Patient   BABY ASPIRIN PO Take 1 capsule by mouth every evening  7/3/2018 at pm Yes Reported, Patient   Insulin Glargine (BASAGLAR KWIKPEN SC) Inject 20 Units Subcutaneous At Bedtime 7/3/2018 at Unknown time Yes Reported, Patient   insulin lispro (HUMALOG KWIKPEN 100 UNIT/ML SC SOLN) 100 UNIT/ML injection Inject Subcutaneous 4 times daily (with meals and nightly) 3 units/carb unit 7/3/2018 at Unknown time Yes Reported, Patient   levETIRAcetam (KEPPRA) 250 MG tablet TAKE 1 TABLET(250 MG) BY MOUTH TWICE DAILY 7/3/2018 at Unknown time Yes Bill Munoz MD               Review of Systems:   A Comprehensive greater than 10 system review of systems was carried out.  Pertinent positives and negatives are noted above.  Otherwise negative for contributory information.           Physical Exam:   Blood pressure 120/81, temperature 98  F (36.7  C), temperature source Oral, resp. rate 16, last menstrual period 06/13/2018, SpO2 100 %, not currently breastfeeding.  Exam:    General:  Pleasant nad looks stated age  HEENT:  Head nc/at sclera clear PERRL O/P:  Moist mucus membranes no posterior pharyngeal erythema or exudate.  Neck is supple  Lungs: cta b nl effort   CV:  RRR no m/r/g no le edema  Abd:  S/nt/nd no r/g  Neuro:  Cn 2-12 grossly intact and strength is intact in b ue and le  Alert and oriented affect appropriate    Skin warm and slightly diaphoretic during my exam               Data:          Lab Results   Component Value Date     07/04/2018    Lab Results   Component Value Date    CHLORIDE 98 07/04/2018    Lab Results   Component Value Date    BUN 19 07/04/2018      Lab Results   Component Value Date    POTASSIUM 3.9 07/04/2018    Lab Results   Component Value Date    CO2 14 07/04/2018    Lab Results   Component Value Date    CR 0.79 07/04/2018        Lab Results   Component Value Date    WBC 19.0 (H) 07/04/2018    HGB 14.3 07/04/2018    HCT 42.8 07/04/2018    MCV 92 07/04/2018     07/04/2018     EKG:  NSR without acute ST-T changes, to my read       Imaging:     Recent Results (from the past 24 hour(s))   XR Chest 2 Views    Narrative    CHEST TWO VIEWS    7/4/2018 11:43 AM     HISTORY: Chest pain.    COMPARISON: 4/15/2009.     FINDINGS: Cardiomediastinal silhouette within normal limits. No focal  airspace opacities. No pleural effusion. No pneumothorax identified.  Mild degenerative changes in the midthoracic spine.       Impression    IMPRESSION: No acute cardiopulmonary abnormalities.    IHSAN SEGURA MD        Name band;

## 2018-07-04 NOTE — ED TRIAGE NOTES
Pt here with c/o CP since about 0630 this morning after 2 episodes of emesis, no blood noted. Pt type 1 diabetic, states her sugars have been in the 400s-500s the past 3 days. Denies cough, calf pain/swelling, recent travel. ABC intact.

## 2018-07-04 NOTE — IP AVS SNAPSHOT
[place] [id number] [facility]    [address]    [facility] [place] [address]    Phone:  [phone]    Fax:  [phone]                                       After Visit Summary   7/4/2018    Mary Veliz    MRN: [id number]           After Visit Summary Signature Page     I have received my discharge instructions, and my questions have been answered. I have discussed any challenges I see with this plan with the nurse or doctor.    ..........................................................................................................................................  Patient/Patient Representative Signature      ..........................................................................................................................................  Patient Representative Print Name and Relationship to Patient    ..................................................               ................................................  Date                                            Time    ..........................................................................................................................................  Reviewed by Signature/Title    ...................................................              ..............................................  Date                                                            Time

## 2018-07-04 NOTE — PHARMACY-ADMISSION MEDICATION HISTORY
Admission medication history interview status for this patient is complete. See [facility] admission navigator for allergy information, prior to admission medications and immunization status.     Medication history interview source(s):Patient  Medication history resources (including written lists, pill bottles, clinic record):None  [facility]    Changes made to PTA medication list:  Added: none  Deleted: none  Changed: novolog, Tylenol    Actions taken by pharmacist (provider contacted, etc):None     Additional medication history information:None    Medication reconciliation/reorder completed by provider prior to medication history? No    Do you take OTC medications (eg tylenol, ibuprofen, fish oil, eye/ear drops, etc)? Y(Y/N)    For patients on insulin therapy: Y (Y/N)  Lantus/levemir/NPH/Mix 70/30 dose:   (Y/N) (see Med list for doses)   Sliding scale Novolog N  If Yes, do you have a baseline novolog pre-meal dose:  units with meals   3 units/carb unit meals and bedtime snack  Patients eat three meals a day:   Y  How many episodes of hypoglycemia do you have per week: ____maybe one___  How many missed doses do you have per week: ___none___  How many times do you check your blood glucose per day: ____not very often- only when things is high or low___   Any Barriers to therapy - Be specific :  cost of medications, comfortable with giving injections (if applicable), comfortable and confident with current diabetes regimen: Y/N __________Had to switch to pens for insurance and with her neck/back issues they are difficult for her to use____      Prior to Admission medications    Medication Sig Last Dose Taking? Auth Provider   Acetaminophen (TYLENOL PO) Take 1,000 mg by mouth every 6 hours as needed for mild pain or fever  7/3/2018 at Unknown time Yes Reported, Patient   BABY ASPIRIN PO Take 1 capsule by mouth every evening  7/3/2018 at pm Yes Reported, Patient   Insulin Glargine (BASAGLAR KWIKPEN SC)  Inject 20 Units Subcutaneous At Bedtime 7/3/2018 at Unknown time Yes Reported, Patient   insulin lispro (HUMALOG KWIKPEN 100 UNIT/ML SC SOLN) 100 UNIT/ML injection Inject Subcutaneous 4 times daily (with meals and nightly) 3 units/carb unit 7/3/2018 at Unknown time Yes Reported, Patient   levETIRAcetam (KEPPRA) 250 MG tablet TAKE 1 TABLET(250 MG) BY MOUTH TWICE DAILY 7/3/2018 at Unknown time Yes Bill Munoz MD

## 2018-07-04 NOTE — IP AVS SNAPSHOT
MRN:[id number]                      After Visit Summary   7/4/2018    Mary Veliz    MRN: [id number]           Thank you!     Thank you for choosing [facility] for your care. Our goal is always to provide you with excellent care. Hearing back from our patients is one way we can continue to improve our services. Please take a few minutes to complete the written survey that you may receive in the mail after you visit. If you would like to speak to someone directly about your visit please contact Patient Relations at [phone]. Thank you!          Patient Information     Date Of Birth          1975        Designated Caregiver       Most Recent Value    Caregiver    Will someone help with your care after discharge? yes    Name of designated caregiver Neil    Phone number of caregiver [phone]    Caregiver address same as pt      About your hospital stay     You were admitted on:  July 4, 2018 You last received care in the:  [place] [address] [facility]    You were discharged on:  July 6, 2018       Who to Call     For medical emergencies, please call 911.  For non-urgent questions about your medical care, please call your primary care provider or clinic, [phone]          Attending Provider     Provider Specialty    Sheryl Menchaca MD Emergency Medicine    Rhoda Mejia MD Internal Medicine       Primary Care Provider Office Phone # Fax #    Jessi Garcia [phone]      After Care Instructions     Activity       Your activity upon discharge: activity as tolerated            Diet       Follow this diet upon discharge: Moderate consistent carbohydrate (3939-7785 milo / 4-6 CHO units per meal)                  Follow-up Appointments     Follow-up and recommended labs and tests        Follow up with primary care provider, JESSI GARCIA, within 7 days for hospital follow- up.  The following labs/tests are recommended: BMP in 7 days.    "               Pending Results     Date and Time Order Name Status Description    2018 1122 Blood culture Preliminary     2018 1122 Blood culture Preliminary             Statement of Approval     Ordered          18 6770  I have reviewed and agree with all the recommendations and orders detailed in this document.  EFFECTIVE NOW     Approved and electronically signed by:  Alec Verduzco DO             Admission Information     Date & Time Provider Department Dept. Phone    2018 Rhoda Mejia MD [place] [address] [facility] [phone]      Your Vitals Were     Blood Pressure Temperature Respirations Height Weight Last Period    132/87 (BP Location: Left arm) 98.5  F (36.9  C) (Oral) 16 1.588 m (5' 2.5\") 66.9 kg (147 lb 7.8 oz) 2018    Pulse Oximetry BMI (Body Mass Index)                100% 26.55 kg/m2          [employer] Information     [employer] lets you send messages to your doctor, view your test results, renew your prescriptions, schedule appointments and more. To sign up, go to [email or web address] . Click on \"Log in\" on the left side of the screen, which will take you to the Welcome page. Then click on \"Sign up Now\" on the right side of the page.     You will be asked to enter the access code listed below, as well as some personal information. Please follow the directions to create your username and password.     Your access code is: [email or web address]  Expires: 10/4/2018  2:32 PM     Your access code will  in 90 days. If you need help or a new code, please call your [facility] or [phone].        Care EveryWhere ID     This is your Care EveryWhere ID. This could be used by other organizations to access your [facility] medical records  [id number]        Equal Access to Services     ZEKE MEDRANO : Joy Fernandez, wakaitlinda lueladiaadaha, qaybta kaalmada eliseo, woo hernandez. So [facility] [phone].    ATENCIÓN: Si olga quiles, " tiene a alfaro disposición servicios gratuitos de asistencia lingüística. Edna pablo [phone].    We comply with applicable federal civil rights laws and [place] laws. We do not discriminate on the basis of race, color, national origin, age, disability, sex, sexual orientation, or gender identity.               Review of your medicines      START taking        Dose / Directions    amoxicillin 500 MG capsule   Commonly known as:  AMOXIL   Indication:  tooth infection        Dose:  500 mg   Take 1 capsule (500 mg) by mouth 3 times daily for 7 days   Quantity:  21 capsule   Refills:  0         CONTINUE these medicines which may have CHANGED, or have new prescriptions. If we are uncertain of the size of tablets/capsules you have at home, strength may be listed as something that might have changed.        Dose / Directions    insulin glargine 100 UNIT/ML injection   Commonly known as:  LANTUS SOLOSTAR   This may have changed:  medication strength        Dose:  20 Units   Inject 20 Units Subcutaneous At Bedtime   Quantity:  6 mL   Refills:  0         CONTINUE these medicines which have NOT CHANGED        Dose / Directions    BABY ASPIRIN PO        Dose:  1 capsule   Take 1 capsule by mouth every evening   Refills:  0       HumaLOG KWIKpen 100 UNIT/ML injection   Generic drug:  insulin lispro        Inject Subcutaneous 4 times daily (with meals and nightly) 3 units/carb unit   Refills:  0       levETIRAcetam 250 MG tablet   Commonly known as:  KEPPRA   Used for:  Recurrent seizures (H)        TAKE 1 TABLET(250 MG) BY MOUTH TWICE DAILY   Quantity:  60 tablet   Refills:  11       TYLENOL PO        Dose:  1000 mg   Take 1,000 mg by mouth every 6 hours as needed for mild pain or fever   Refills:  0            Where to get your medicines      These medications were sent to [facility] [address] [address] [address]     Phone:  [phone]     amoxicillin  500 MG capsule    insulin glargine 100 UNIT/ML injection                Protect others around you: Learn how to safely use, store and throw away your medicines at www.disposemymeds.org.        ANTIBIOTIC INSTRUCTION     You've Been Prescribed an Antibiotic - Now What?  Your healthcare team thinks that you or your loved one might have an infection. Some infections can be treated with antibiotics, which are powerful, life-saving drugs. Like all medications, antibiotics have side effects and should only be used when necessary. There are some important things you should know about your antibiotic treatment.      Your healthcare team may run tests before you start taking an antibiotic.    Your team may take samples (e.g., from your blood, urine or other areas) to run tests to look for bacteria. These test can be important to determine if you need an antibiotic at all and, if you do, which antibiotic will work best.      Within a few days, your healthcare team might change or even stop your antibiotic.    Your team may start you on an antibiotic while they are working to find out what is making you sick.    Your team might change your antibiotic because test results show that a different antibiotic would be better to treat your infection.    In some cases, once your team has more information, they learn that you do not need an antibiotic at all. They may find out that you don't have an infection, or that the antibiotic you're taking won't work against your infection. For example, an infection caused by a virus can't be treated with antibiotics. Staying on an antibiotic when you don't need it is more likely to be harmful than helpful.      You may experience side effects from your antibiotic.    Like all medications, antibiotics have side effects. Some of these can be serious.    Let you healthcare team know if you have any known allergies when you are admitted to the hospital.    One significant side effect of nearly all  antibiotics is the risk of severe and sometimes deadly diarrhea caused by Clostridium difficile (C. Difficile). This occurs when a person takes antibiotics because some good germs are destroyed. Antibiotic use allows C. diificile to take over, putting patients at high risk for this serious infection.    As a patient or caregiver, it is important to understand your or your loved one's antibiotic treatment. It is especially important for caregivers to speak up when patients can't speak for themselves. Here are some important questions to ask your healthcare team.    What infection is this antibiotic treating and how do you know I have that infection?    What side effects might occur from this antibiotic?    How long will I need to take this antibiotic?    Is it safe to take this antibiotic with other medications or supplements (e.g., vitamins) that I am taking?     Are there any special directions I need to know about taking this antibiotic? For example, should I take it with food?    How will I be monitored to know whether my infection is responding to the antibiotic?    What tests may help to make sure the right antibiotic is prescribed for me?      Information provided by:  www.cdc.gov/getsmart  U.S. Department of Health and Human Services  Centers for disease Control and Prevention  National Center for Emerging and Zoonotic Infectious Diseases  Division of Healthcare Quality Promotion             Medication List: This is a list of all your medications and when to take them. Check marks below indicate your daily home schedule. Keep this list as a reference.      Medications           Morning Afternoon Evening Bedtime As Needed    amoxicillin 500 MG capsule   Commonly known as:  AMOXIL   Take 1 capsule (500 mg) by mouth 3 times daily for 7 days   Last time this was given:  500 mg on 7/6/2018 10:35 AM            8am       4pm       11pm               BABY ASPIRIN PO   Take 1 capsule by mouth every evening             8am                       HumaLOG KWIKpen 100 UNIT/ML injection   Inject Subcutaneous 4 times daily (with meals and nightly) 3 units/carb unit   Generic drug:  insulin lispro                                insulin glargine 100 UNIT/ML injection   Commonly known as:  LANTUS SOLOSTAR   Inject 20 Units Subcutaneous At Bedtime                                levETIRAcetam 250 MG tablet   Commonly known as:  KEPPRA   TAKE 1 TABLET(250 MG) BY MOUTH TWICE DAILY   Last time this was given:  250 mg on 7/6/2018 10:36 AM            8am           8pm               TYLENOL PO   Take 1,000 mg by mouth every 6 hours as needed for mild pain or fever                                             More Information      Diabetic Ketoacidosis (DKA)  What is diabetic ketoacidosis (DKA)?  Diabetic ketoacidosis (DKA) is a serious problem caused by not having enough insulin. If you have type 1 diabetes, there is a chance you could develop (DKA).  When the body does not have enough insulin to use glucose (sugar) for energy, it begins to break down the fat in your body. This creates toxic acids called ketones. When too many ketones build up, it can lead to DKA, which may cause a coma (passing out for a long time) or death.  What are the symptoms of DKA?  DKA can develop quickly. Know the warning signs:    Extreme thirst    Needing to urinate more often    Feeling sick to your stomach and throwing up    Abdominal (belly) pain    Weakness    Feeling drowsy or tired    Shortness of breath    Breath smells sweet or fruity    Confusion  How can I know if my body is making ketones?  You will need to check your ketone level. Some glucose meters can measure ketones using a blood ketone test strip. Special test strips can also measure ketones in urine. You can buy testing supplies at your pharmacy with or without a prescription.   Ketones may build up when your blood glucose level is high.  You should always check for ketones if:    Your blood glucose  has been over 240 mg/dL and has not come down within 4 hours of taking your insulin correction dose.    You have any symptoms of DKA.  Follow the directions that came with your ketone test strips to find out your ketone level.  Your doctor or diabetes educator can teach you how to check for ketones and what to do if you have them. If ketones are caught early, there are certain things you can do at home to help prevent a serious problem.   What should I do if I have ketones?  For high or very high levels:  Call your clinic. If you have high ketones AND you are vomiting (throwing up), have someone drive you to the Emergency Room or call 911.  For trace to moderate amounts:  Call your clinic. Ask to speak with a nurse. Tell them you have ketones due to diabetes. A doctor will call you back.   You can likely reverse mild ketone production at home if:    You can still drink and retain fluids, and    You or your family can check blood glucose and ketones regularly, and    Your doctor is available to guide you over the phone.  Your doctor will have you:    Check blood glucose every 2 to 4 hours.    Check ketones every 4 hours until negative.    Take correction insulin doses as directed by your doctor or diabetes educator. You will likely need more insulin to help reduce blood glucose and stop ketone production.    Carbohydrates are also needed to stop ketone production. Take your rapid-acting insulin to cover the carbohydrate you eat or drink.    Drink plenty of fluids--this will help you flush out ketones.  ? Drink 4 ounces (   cup) of carbohydrate-containing liquid (like juice or regular soda) every hour.  ? Drink calorie-free beverages (like water), and fluids containing sodium (like broth). This will help your body retain water.  ? Sports drinks (like Gatorade or Powerade) contain both carbohydrate and sodium and work well for hydration.  If it is after clinic hours (8 a.m. to 4:30 p.m., Monday through Friday):  Call  your clinic and ask to speak with the doctor on call. Or, go to Urgent Care.   If these options are not available, go to the Emergency Room.  For informational purposes only. Not to replace the advice of your health care provider.   Copyright   2016 [employer]. All rights reserved. [employer] [id number] - 06/16.

## 2018-07-04 NOTE — ED PROVIDER NOTES
History     Chief Complaint:  Hyperglycemia; Chest Pain    HPI   Mary Veliz is a 43 year old female with a history of type 1 diabetes who presents for evaluation of hyperglycemia and chest pain. The patient reports she started feeling generally unwell and fatigued a few days ago. Her blood sugars have been high in the 400-500s over the last three days. She reports she has been taking her insulin (Humalog, Basaglar). Symptoms have been gradually worsening and she woke up with nausea and had two episodes of nonbloody emesis this morning. She also reports she developed burning chest pain across her anterior chest at 6am this morning and felt slightly short of breath. Chest pain has been coming and going throughout the day. She reports she has had chest pain with her hyperglycemia in the past, though has not been hospitalized for her diabetes in a few years. She checked her blood sugar again this morning and it was still high. She took 5 units of Humalog this morning and  brought her to the ED for evaluation. The patient denies any fevers, abdominal pain, cough, diarrhea, dysuria, or hematuria. She has no known cardiac history.     Allergies:  Codeine sulfate     Medications:    Acetaminophen  Aspirin 81 mg   Insulin glargine  Insulin lispro  Levetiracetam    Past Medical History:    Diabetes  Seizures    Past Surgical History:    Orthopedic surgery  Head and neck surgery  Tubal ligation   ENT surgery    Family History:    Heart disease, father at age 54    Social History:  Smoking status: Current everyday smoker, 1 ppd  Alcohol use: No  Marital Status:   [2]     Review of Systems   Constitutional: Positive for fatigue. Negative for fever.   Respiratory: Positive for shortness of breath. Negative for cough.    Cardiovascular: Positive for chest pain.   Gastrointestinal: Positive for nausea and vomiting. Negative for abdominal pain, blood in stool and diarrhea.   Genitourinary: Negative for  difficulty urinating, dysuria and hematuria.   All other systems reviewed and are negative.      Physical Exam   Patient Vitals for the past 24 hrs:   BP Temp Temp src Heart Rate Resp SpO2   07/04/18 1100 106/68 - - - - -   07/04/18 1045 111/83 - - - - -   07/04/18 1034 - 98  F (36.7  C) Oral - - -   07/04/18 1030 - - - - - 99 %   07/04/18 1028 (!) 144/111 - Oral 109 16 100 %       Physical Exam  General: Patient is alert and interactive when I enter the room  Head:  The scalp, face, and head appear normal  Eyes:  Conjunctivae are normal  ENT:    The nose is normal    Pinnae are normal    External acoustic canals are normal, dry mucous membrane  Neck:  Trachea midline  CV:  Pulses are normal, tachycardic, RR.    Resp:  No respiratory distress, CTAB, scattered expiratory wheezes   Abdomen:      Soft, non-tender, non-distended  Musc:  Normal muscular tone    No major joint effusions  Skin:  No rash or lesions noted  Neuro:  Speech is normal and fluent. Face is symmetric.     Moving all extremities well.   Psych: Awake. Alert.  Normal affect.  Appropriate interactions.      Emergency Department Course   ECG (10:27:20):  Rate 106 bpm. AZ interval 136. QRS duration 70. QT/QTc 338/448. P-R-T axes 71 76 54. Sinus tachycardia. Otherwise normal ECG   Interpreted at 1027 by Sheryl Menchaca MD.    Imaging:  Radiographic findings were communicated with the patient who voiced understanding of the findings.    XR Chest 2 views  No acute cardiopulmonary abnormalities.  As read by Radiology.    Laboratory:  CBC: WBC 19.0(H),o/w WNL (HGB 14.3, )  BMP: Glucose 416(H), (L), Carbon dioxide 14(L), Anion gap 20(H), o/w WNL (Creatinine 0.79)  VBG: pH 7.27(L), pCO2 33(L), Bicarbonate venous 15(L), o/w WNL  Lactic acid: 3.8(H)  Repeat Lactic acid: 1.0  UA: Glucose >499, Ketones 80, Blood small, Bacteria few, Mucous present, o/w negative  Troponin: <0.015  Ketones: 4.7(HH)  Hemoglobin A1c: 8.9(H)  Glucose (1026):  404(H)  Glucose (1235): 225(H)  Glucose (1326): 233(H)  Glucose (1357): 170(H)    Blood cultures x2: in process    Interventions:  1042: NS 1L IV Bolus  1043: Zofran 4 mg IV  1148: potassium chloride 10 mEq IV  1238: Insulin drip 2 units/hr IV  1240: NS 1L IV Infusion  1403: Insulin drip rate dose change 1 unit/hr IV    Emergency Department Course:  Past medical records, nursing notes, and vitals reviewed.  1024: I performed an exam of the patient and obtained history, as documented above.  IV inserted and blood drawn.  ECG obtained, results above.   The patient was sent for a chest x-ray while in the emergency department, findings above.    1140: I rechecked the patient. Explained findings to the patient.    1218: I spoke to Dr. Mejia of the hospitalist service who accepts the patient for admission.     Findings and plan explained to the Patient who consents to admission.   Discussed the patient with Dr. Mejia, who will admit the patient to an ICU bed for further monitoring, evaluation, and treatment.     Impression & Plan      CMS Diagnoses: The Lactic acid level is elevated due to DKA, at this time there is no sign of severe sepsis or septic shock. Repeat Lactic acid is improved to 1.0    Medical Decision Making:  Mary Veliz is a 43 year old female with a history of type 1 diabetes who presents with chest pain and hyperglycemia. When she arrived, she was tachycardic to 109 but otherwise hemodynamically stable and afebrile. Her lungs were clear to auscultation bilaterally. ECG revealed sinus tachycardia with no signs of ischemic changes. She does take insulin almost 5 times a day and she did take it this morning. Her sugars have been running high in the 400s or 500s in the past few days and she has not been feeling well as well. She had some vomiting. This is suspicious for DKA with the tachycardia, the vomiting, and the shortness of breath. Her blood sugar was elevated at 404 here, her ketones were 4.7, her  pH was 7.27 with a bicarb of 15. This is all consistent with DKA. The patient was given IV fluids and started on an infusion of both insulin and normal saline. She was given potassium 10 mEq IV as her potassium is 3.9. Her lactate was elevated at 3.8. This is all secondary to her DKA. She does have a white count of 19, however she doesn't really have infectious symptoms. She reports chest pain and shortness of breath, however this improved with Zofran and fluids. I doubt ACS as it seemed to be related to her DKA. Her troponin was negative and chest x-ray revealed no pneumothorax or pneumonia. The patient felt improved. She remained hemodynamically stable. The patient will be admitted to the ICU for continued treatment of her DKA. Patient admitted.     Diagnosis:    ICD-10-CM   1. DKA, type 1 (H) E10.10       Disposition: Admitted ICU under the care of Dr. Jackie Davila  7/4/2018   [facility] EMERGENCY DEPARTMENT    ICitlaly, am serving as a scribe at 10:24 AM on 7/4/2018 to document services personally performed by Sheryl Menchaca MD based on my observations and the provider's statements to me.        Sheryl Menchaca MD  07/05/18 0471

## 2018-07-05 LAB
ANION GAP SERPL CALCULATED.3IONS-SCNC: 7 MMOL/L (ref 3–14)
ANION GAP SERPL CALCULATED.3IONS-SCNC: 8 MMOL/L (ref 3–14)
BASOPHILS # BLD AUTO: 0.1 10E9/L (ref 0–0.2)
BASOPHILS NFR BLD AUTO: 0.9 %
BUN SERPL-MCNC: 10 MG/DL (ref 7–30)
BUN SERPL-MCNC: 6 MG/DL (ref 7–30)
CALCIUM SERPL-MCNC: 7.7 MG/DL (ref 8.5–10.1)
CALCIUM SERPL-MCNC: 7.7 MG/DL (ref 8.5–10.1)
CHLORIDE SERPL-SCNC: 110 MMOL/L (ref 94–109)
CHLORIDE SERPL-SCNC: 111 MMOL/L (ref 94–109)
CO2 SERPL-SCNC: 21 MMOL/L (ref 20–32)
CO2 SERPL-SCNC: 21 MMOL/L (ref 20–32)
CREAT SERPL-MCNC: 0.53 MG/DL (ref 0.52–1.04)
CREAT SERPL-MCNC: 0.59 MG/DL (ref 0.52–1.04)
DIFFERENTIAL METHOD BLD: NORMAL
EOSINOPHIL # BLD AUTO: 0.4 10E9/L (ref 0–0.7)
EOSINOPHIL NFR BLD AUTO: 3.4 %
ERYTHROCYTE [DISTWIDTH] IN BLOOD BY AUTOMATED COUNT: 12.7 % (ref 10–15)
GFR SERPL CREATININE-BSD FRML MDRD: >90 ML/MIN/1.7M2
GFR SERPL CREATININE-BSD FRML MDRD: >90 ML/MIN/1.7M2
GLUCOSE BLDC GLUCOMTR-MCNC: 110 MG/DL (ref 70–99)
GLUCOSE BLDC GLUCOMTR-MCNC: 134 MG/DL (ref 70–99)
GLUCOSE BLDC GLUCOMTR-MCNC: 147 MG/DL (ref 70–99)
GLUCOSE BLDC GLUCOMTR-MCNC: 164 MG/DL (ref 70–99)
GLUCOSE BLDC GLUCOMTR-MCNC: 191 MG/DL (ref 70–99)
GLUCOSE BLDC GLUCOMTR-MCNC: 200 MG/DL (ref 70–99)
GLUCOSE BLDC GLUCOMTR-MCNC: 204 MG/DL (ref 70–99)
GLUCOSE BLDC GLUCOMTR-MCNC: 209 MG/DL (ref 70–99)
GLUCOSE BLDC GLUCOMTR-MCNC: 218 MG/DL (ref 70–99)
GLUCOSE BLDC GLUCOMTR-MCNC: 229 MG/DL (ref 70–99)
GLUCOSE BLDC GLUCOMTR-MCNC: 76 MG/DL (ref 70–99)
GLUCOSE BLDC GLUCOMTR-MCNC: 93 MG/DL (ref 70–99)
GLUCOSE BLDC GLUCOMTR-MCNC: 94 MG/DL (ref 70–99)
GLUCOSE BLDC GLUCOMTR-MCNC: 98 MG/DL (ref 70–99)
GLUCOSE SERPL-MCNC: 114 MG/DL (ref 70–99)
GLUCOSE SERPL-MCNC: 123 MG/DL (ref 70–99)
HCT VFR BLD AUTO: 37 % (ref 35–47)
HGB BLD-MCNC: 12.5 G/DL (ref 11.7–15.7)
IMM GRANULOCYTES # BLD: 0 10E9/L (ref 0–0.4)
IMM GRANULOCYTES NFR BLD: 0.4 %
INTERPRETATION ECG - MUSE: NORMAL
LYMPHOCYTES # BLD AUTO: 3 10E9/L (ref 0.8–5.3)
LYMPHOCYTES NFR BLD AUTO: 28.4 %
MCH RBC QN AUTO: 31 PG (ref 26.5–33)
MCHC RBC AUTO-ENTMCNC: 33.8 G/DL (ref 31.5–36.5)
MCV RBC AUTO: 92 FL (ref 78–100)
MONOCYTES # BLD AUTO: 1.1 10E9/L (ref 0–1.3)
MONOCYTES NFR BLD AUTO: 10.5 %
NEUTROPHILS # BLD AUTO: 5.9 10E9/L (ref 1.6–8.3)
NEUTROPHILS NFR BLD AUTO: 56.4 %
NRBC # BLD AUTO: 0 10*3/UL
NRBC BLD AUTO-RTO: 0 /100
PLATELET # BLD AUTO: 229 10E9/L (ref 150–450)
POTASSIUM SERPL-SCNC: 3.3 MMOL/L (ref 3.4–5.3)
POTASSIUM SERPL-SCNC: 3.7 MMOL/L (ref 3.4–5.3)
RBC # BLD AUTO: 4.03 10E12/L (ref 3.8–5.2)
SODIUM SERPL-SCNC: 139 MMOL/L (ref 133–144)
SODIUM SERPL-SCNC: 139 MMOL/L (ref 133–144)
WBC # BLD AUTO: 10.5 10E9/L (ref 4–11)

## 2018-07-05 PROCEDURE — 00000146 ZZHCL STATISTIC GLUCOSE BY METER IP

## 2018-07-05 PROCEDURE — 99232 SBSQ HOSP IP/OBS MODERATE 35: CPT | Performed by: INTERNAL MEDICINE

## 2018-07-05 PROCEDURE — 80048 BASIC METABOLIC PNL TOTAL CA: CPT | Performed by: INTERNAL MEDICINE

## 2018-07-05 PROCEDURE — 25000132 ZZH RX MED GY IP 250 OP 250 PS 637: Performed by: INTERNAL MEDICINE

## 2018-07-05 PROCEDURE — 85025 COMPLETE CBC W/AUTO DIFF WBC: CPT | Performed by: INTERNAL MEDICINE

## 2018-07-05 PROCEDURE — 25000131 ZZH RX MED GY IP 250 OP 636 PS 637: Performed by: INTERNAL MEDICINE

## 2018-07-05 PROCEDURE — 20000003 ZZH R&B ICU

## 2018-07-05 PROCEDURE — 36415 COLL VENOUS BLD VENIPUNCTURE: CPT | Performed by: INTERNAL MEDICINE

## 2018-07-05 RX ORDER — NICOTINE POLACRILEX 4 MG
15-30 LOZENGE BUCCAL
Status: DISCONTINUED | OUTPATIENT
Start: 2018-07-05 | End: 2018-07-05

## 2018-07-05 RX ORDER — ASPIRIN 81 MG/1
81 TABLET ORAL DAILY
Status: DISCONTINUED | OUTPATIENT
Start: 2018-07-05 | End: 2018-07-06 | Stop reason: HOSPADM

## 2018-07-05 RX ORDER — DEXTROSE MONOHYDRATE 25 G/50ML
25-50 INJECTION, SOLUTION INTRAVENOUS
Status: DISCONTINUED | OUTPATIENT
Start: 2018-07-05 | End: 2018-07-05

## 2018-07-05 RX ADMIN — LEVETIRACETAM 250 MG: 250 TABLET, FILM COATED ORAL at 21:39

## 2018-07-05 RX ADMIN — LEVETIRACETAM 250 MG: 250 TABLET, FILM COATED ORAL at 09:11

## 2018-07-05 RX ADMIN — ASPIRIN 81 MG: 81 TABLET, COATED ORAL at 21:39

## 2018-07-05 RX ADMIN — AMOXICILLIN 500 MG: 500 CAPSULE ORAL at 21:39

## 2018-07-05 RX ADMIN — INSULIN ASPART 3 UNITS: 100 INJECTION, SOLUTION INTRAVENOUS; SUBCUTANEOUS at 19:06

## 2018-07-05 RX ADMIN — POTASSIUM CHLORIDE 20 MEQ: 1500 TABLET, EXTENDED RELEASE ORAL at 03:12

## 2018-07-05 RX ADMIN — AMOXICILLIN 500 MG: 500 CAPSULE ORAL at 09:11

## 2018-07-05 RX ADMIN — POTASSIUM CHLORIDE 40 MEQ: 1500 TABLET, EXTENDED RELEASE ORAL at 01:09

## 2018-07-05 RX ADMIN — AMOXICILLIN 500 MG: 500 CAPSULE ORAL at 16:49

## 2018-07-05 RX ADMIN — INSULIN GLARGINE 20 UNITS: 100 INJECTION, SOLUTION SUBCUTANEOUS at 21:39

## 2018-07-05 NOTE — PLAN OF CARE
Problem: Patient Care Overview  Goal: Plan of Care/Patient Progress Review  Outcome: Improving  ICU End of Shift Summary.  For vital signs and complete assessments, please see documentation flowsheets.     Pertinent assessments: AOx4, denies pain (beyond baseline pain assoc with neck). Tele SR. LS clear, sats >95% room air. Loose cough, nonproductive. Good uop, no BM. Denies nausea. Tolerating reg diet.    Major Shift Events: Admitted to ICU from ED @ 1620. BGs 200's.  Plan (Upcoming Events): Insulin gtt until ion gap closes. Stress test possible. BMP q 6hrs  Discharge/Transfer Needs: Pt reports insurance issues interfering with access to insulin    Bedside Shift Report Completed : y  Bedside Safety Check Completed: y

## 2018-07-05 NOTE — PLAN OF CARE
Problem: Patient Care Overview  Goal: Plan of Care/Patient Progress Review  Outcome: Improving  ICU End of Shift Summary.  For vital signs and complete assessments, please see documentation flowsheets.     Pertinent assessments: A&O. VSS; Tmax 99.1. Tele SR. Denies pain other than baseline chronic pain. LS CL. Good UOP. Up to bathroom with SBA.   Major Shift Events: Insulin gtt off at 0300.   Plan (Upcoming Events): Continue to monitor BG  Discharge/Transfer Needs: Home when stable    Bedside Shift Report Completed :   Bedside Safety Check Completed:

## 2018-07-05 NOTE — PROGRESS NOTES
"[facility]  Hospitalist Progress Note  Alec Verduzco,  07/05/2018    Reason for Stay (Diagnosis): Diabetic ketoacidosis         Assessment and Plan:      Summary of Stay: Mary Veliz is a 43 year old female admitted on 7/4/2018 with diabetic ketoacidosis.  Problem List:   1. Diabetic ketoacidosis.  Ketoacidosis has resolved.  Stop continuous insulin drip.  Continue Lantus insulin.  Start NovoLog carb counting with meals and NovoLog sliding scale.  2. Chest pain.  Serial troponins unremarkable.  Plan for Lexiscan cardiac stress test tomorrow.  Restart aspirin 81 mg a day.  Check lipid panel.  3. Hypokalemia.  Currently resolved.  Continue potassium replacement protocol.  4. Toothache.  Suspect possible underlying dental infection.  Continue amoxicillin.  Needs to follow-up with dentist after discharge.  5. Seizure disorder.  Continue Keppra.  6. Lactic acidosis.  Suspect that this was due to diabetic ketoacidosis.  Now resolved.  DVT Prophylaxis: Pneumatic Compression Devices  Code Status: Full Code  Discharge Dispo: Home  Estimated Disch Date / # of Days until Disch: 1-2        Interval History (Subjective):      No further chest pain.  Denies shortness of breath, fevers, chills, nausea, vomiting, or diarrhea.                  Physical Exam:      Last Vital Signs:  /79  Temp 97.9  F (36.6  C) (Oral)  Resp 16  Ht 1.588 m (5' 2.5\")  Wt 66.9 kg (147 lb 7.8 oz)  LMP 06/13/2018  SpO2 100%  BMI 26.55 kg/m2    Gen:  NAD, A&Ox3.  Eyes:  PERRL, sclera anicteric.  OP:  MMM, no lesions.  Neck:  Supple.  CV:  Regular, no murmurs.  Lung:  CTA b/l, normal effort.  Ab:  +BS, soft.  Skin:  Warm, dry to touch.  No rash.  Ext:  No pitting edema LE b/l.           Medications:      All current medications were reviewed with changes reflected in problem list.         Data:      All new lab and imaging data was reviewed.   Labs:    Recent Labs  Lab 07/05/18  0603      POTASSIUM 3.7   CHLORIDE " 111*   CO2 21   ANIONGAP 7   *   BUN 6*   CR 0.53   GFRESTIMATED >90   GFRESTBLACK >90   JILLIAN 7.7*       Recent Labs  Lab 07/05/18  0603   WBC 10.5   HGB 12.5   HCT 37.0   MCV 92         Imaging:   No results found for this or any previous visit (from the past 24 hour(s)).

## 2018-07-05 NOTE — PLAN OF CARE
Problem: Patient Care Overview  Goal: Plan of Care/Patient Progress Review  Outcome: Improving  ICU End of Shift Summary.  For vital signs and complete assessments, please see documentation flowsheets.     Pertinent assessments: AOx4, denies pain. Tele SR. LS clear Sats high 90's room air. Tolerating modCHO diet. Good UOP. BGs consistent through day.  Major Shift Events: Insulin gtt and IVF off, transition to Lantus, SS, Carb count  Plan (Upcoming Events): Monitor BGs, Kaila scan tomorrow, No caffeine until after study. Monitor electrolytes, replace per protocol  Discharge/Transfer Needs: D/C possible tomorrow if studies unremarkable    Bedside Shift Report Completed : y  Bedside Safety Check Completed: y

## 2018-07-05 NOTE — PHARMACY
Consult from [facility] pharmacist.  Patient identifies barrier in administering insulin because she has trouble with the insulin pens due to dexterity and prefers the vials (syringe is smaller, more places to administer, etc.)    Humalog vials are covered.  Lantus vials are not.  Provided patient a  assistance card that she can use to bypass insurance and get Lantus vials at $0.    RICHARD Cerda, Pharmacy Technician/Liaison, Discharge Pharmacy [phone]

## 2018-07-06 ENCOUNTER — APPOINTMENT (OUTPATIENT)
Dept: NUCLEAR MEDICINE | Facility: CLINIC | Age: 43
DRG: 639 | End: 2018-07-06
Attending: INTERNAL MEDICINE
Payer: COMMERCIAL

## 2018-07-06 VITALS
SYSTOLIC BLOOD PRESSURE: 114 MMHG | BODY MASS INDEX: 26.13 KG/M2 | DIASTOLIC BLOOD PRESSURE: 71 MMHG | WEIGHT: 147.49 LBS | OXYGEN SATURATION: 100 % | HEIGHT: 63 IN | TEMPERATURE: 98.1 F | RESPIRATION RATE: 19 BRPM

## 2018-07-06 LAB
ALBUMIN SERPL-MCNC: 3.1 G/DL (ref 3.4–5)
ALP SERPL-CCNC: 59 U/L (ref 40–150)
ALT SERPL W P-5'-P-CCNC: 18 U/L (ref 0–50)
ANION GAP SERPL CALCULATED.3IONS-SCNC: 9 MMOL/L (ref 3–14)
AST SERPL W P-5'-P-CCNC: 12 U/L (ref 0–45)
BILIRUB SERPL-MCNC: 0.8 MG/DL (ref 0.2–1.3)
BUN SERPL-MCNC: 13 MG/DL (ref 7–30)
CALCIUM SERPL-MCNC: 8.2 MG/DL (ref 8.5–10.1)
CHLORIDE SERPL-SCNC: 106 MMOL/L (ref 94–109)
CHOLEST SERPL-MCNC: 150 MG/DL
CO2 SERPL-SCNC: 23 MMOL/L (ref 20–32)
CREAT SERPL-MCNC: 0.62 MG/DL (ref 0.52–1.04)
GFR SERPL CREATININE-BSD FRML MDRD: >90 ML/MIN/1.7M2
GLUCOSE BLDC GLUCOMTR-MCNC: 122 MG/DL (ref 70–99)
GLUCOSE BLDC GLUCOMTR-MCNC: 170 MG/DL (ref 70–99)
GLUCOSE BLDC GLUCOMTR-MCNC: 202 MG/DL (ref 70–99)
GLUCOSE BLDC GLUCOMTR-MCNC: 236 MG/DL (ref 70–99)
GLUCOSE BLDC GLUCOMTR-MCNC: 282 MG/DL (ref 70–99)
GLUCOSE SERPL-MCNC: 206 MG/DL (ref 70–99)
HDLC SERPL-MCNC: 59 MG/DL
LDLC SERPL CALC-MCNC: 75 MG/DL
NONHDLC SERPL-MCNC: 91 MG/DL
POTASSIUM SERPL-SCNC: 4 MMOL/L (ref 3.4–5.3)
PROT SERPL-MCNC: 6.4 G/DL (ref 6.8–8.8)
SODIUM SERPL-SCNC: 138 MMOL/L (ref 133–144)
TRIGL SERPL-MCNC: 81 MG/DL

## 2018-07-06 PROCEDURE — 25000132 ZZH RX MED GY IP 250 OP 250 PS 637: Performed by: INTERNAL MEDICINE

## 2018-07-06 PROCEDURE — 93017 CV STRESS TEST TRACING ONLY: CPT

## 2018-07-06 PROCEDURE — 00000146 ZZHCL STATISTIC GLUCOSE BY METER IP

## 2018-07-06 PROCEDURE — 78452 HT MUSCLE IMAGE SPECT MULT: CPT

## 2018-07-06 PROCEDURE — 78452 HT MUSCLE IMAGE SPECT MULT: CPT | Mod: 26 | Performed by: INTERNAL MEDICINE

## 2018-07-06 PROCEDURE — 25000128 H RX IP 250 OP 636

## 2018-07-06 PROCEDURE — 80053 COMPREHEN METABOLIC PANEL: CPT | Performed by: INTERNAL MEDICINE

## 2018-07-06 PROCEDURE — 93018 CV STRESS TEST I&R ONLY: CPT | Performed by: INTERNAL MEDICINE

## 2018-07-06 PROCEDURE — 36415 COLL VENOUS BLD VENIPUNCTURE: CPT | Performed by: INTERNAL MEDICINE

## 2018-07-06 PROCEDURE — 34300033 ZZH RX 343: Performed by: INTERNAL MEDICINE

## 2018-07-06 PROCEDURE — A9502 TC99M TETROFOSMIN: HCPCS | Performed by: INTERNAL MEDICINE

## 2018-07-06 PROCEDURE — 99239 HOSP IP/OBS DSCHRG MGMT >30: CPT | Performed by: INTERNAL MEDICINE

## 2018-07-06 PROCEDURE — 80061 LIPID PANEL: CPT | Performed by: INTERNAL MEDICINE

## 2018-07-06 RX ORDER — AMOXICILLIN 500 MG/1
500 CAPSULE ORAL 3 TIMES DAILY
Qty: 21 CAPSULE | Refills: 0 | Status: SHIPPED | OUTPATIENT
Start: 2018-07-06 | End: 2018-07-13

## 2018-07-06 RX ORDER — REGADENOSON 0.08 MG/ML
INJECTION, SOLUTION INTRAVENOUS
Status: COMPLETED
Start: 2018-07-06 | End: 2018-07-06

## 2018-07-06 RX ORDER — AMINOPHYLLINE 25 MG/ML
INJECTION, SOLUTION INTRAVENOUS
Status: DISCONTINUED
Start: 2018-07-06 | End: 2018-07-06 | Stop reason: HOSPADM

## 2018-07-06 RX ADMIN — LEVETIRACETAM 250 MG: 250 TABLET, FILM COATED ORAL at 10:36

## 2018-07-06 RX ADMIN — AMOXICILLIN 500 MG: 500 CAPSULE ORAL at 10:35

## 2018-07-06 RX ADMIN — AMOXICILLIN 500 MG: 500 CAPSULE ORAL at 15:34

## 2018-07-06 RX ADMIN — TETROFOSMIN 11 MCI.: 1.38 INJECTION, POWDER, LYOPHILIZED, FOR SOLUTION INTRAVENOUS at 07:01

## 2018-07-06 RX ADMIN — REGADENOSON 0.4 MG: 0.08 INJECTION, SOLUTION INTRAVENOUS at 08:47

## 2018-07-06 RX ADMIN — TETROFOSMIN 32 MCI.: 1.38 INJECTION, POWDER, LYOPHILIZED, FOR SOLUTION INTRAVENOUS at 09:19

## 2018-07-06 ASSESSMENT — ACTIVITIES OF DAILY LIVING (ADL)
ADLS_ACUITY_SCORE: 11

## 2018-07-06 NOTE — PLAN OF CARE
Problem: Diabetes, Type 1 (Adult)  Goal: Signs and Symptoms of Listed Potential Problems Will be Absent, Minimized or Managed (Diabetes, Type 1)  Signs and symptoms of listed potential problems will be absent, minimized or managed by discharge/transition of care (reference Diabetes, Type 1 (Adult) CPG).   Outcome: Improving  Pt transferred to us from the ICU overnight, all VSS, LS clear and satting well on RA.   and 170 this shift.  Tele: SR.  A&Ox4, moves independently.  No complaints of pain or nausea, no PRN's given.  Plan for Lexiscan this AM and possible d/c later today.  Will continue with POC.

## 2018-07-06 NOTE — CONSULTS
"CLINICAL NUTRITION SERVICES  -  ASSESSMENT NOTE      Malnutrition: Patient does not meet malnutrition criteria at this time        REASON FOR ASSESSMENT  Mary Veliz is a 43 year old female seen by Registered Dietitian for Admission Nutrition Risk Screen - New/uncontrolled diabetes.      NUTRITION HISTORY  - Information obtained from patient and chart.    - Patient with a h/o DMI, concern for tooth abscess.    - Vomiting began day prior to admit.   - Regular diet at home, CHO counts.  - Does not have an endocrinologist, \"they treat me like I'm 3\".  Has a PCP she follows with \"depending on how I feel\".  - Checks BG \"not very often\" - \"when I'm high and when I'm low\".   - Meals BID is typical, does not always have breakfast.  Or breakfast may be latter in the day.  - Snacks \"more at night\".   - NKFA.        CURRENT NUTRITION ORDERS  Diet Order:     Mod CHO    Current Intake/Tolerance:  100% intake since admit.        PHYSICAL FINDINGS  Observed  See below  Obtained from Chart/Interdisciplinary Team  No nutritionally pertinent     ANTHROPOMETRICS  Height: 5' 2.5\"  Weight: 66.8 kg (147#)  Body mass index is 26.55 kg/(m^2).  Weight Status:  Overweight BMI 25-29.9  IBW: 50 kg (110#)  % IBW: 134%  Weight History:  Wt Readings from Last 10 Encounters:   07/05/18 66.9 kg (147 lb 7.8 oz)   02/23/18 67.1 kg (148 lb)   03/30/17 66.3 kg (146 lb 3.2 oz)   12/30/16 66.2 kg (146 lb)   - Wt stable. Patient herself denies wt loss.      LABS  Labs reviewed:  Lab Results   Component Value Date    A1C 8.9 07/04/2018    A1C 10.6 03/14/2008       MEDICATIONS  Medications reviewed:  - Insulin regimen reviewed, CHO counting      ASSESSED NUTRITION NEEDS PER APPROVED PRACTICE GUIDELINES:    Dosing Weight 54.2 kg - adjusted weight   Estimated Energy Needs: 1367-4442 kcals (25-30 Kcal/Kg)  Justification: maintenance  Estimated Protein Needs: 65-81 grams protein (1.2-1.5 g pro/Kg)  Justification: preservation of lean body mass  Estimated " Fluid Needs: 4792-3558 mL (1 mL/Kcal)  Justification: maintenance and per provider pending fluid status    MALNUTRITION:  % Weight Loss:  None noted  % Intake:  No decreased intake noted  Subcutaneous Fat Loss:  None observed  Muscle Loss:  None observed  Fluid Retention:  None noted    Malnutrition Diagnosis: Patient does not meet two of the above criteria necessary for diagnosing malnutrition    NUTRITION DIAGNOSIS:  Altered nutrition-related lab value (BG) related to ?self-monitoring deficit as evidenced by A1C >8%.      NUTRITION INTERVENTIONS  Recommendations / Nutrition Prescription  Liberalize diet to regular given CHO counting.  MD to sign if in agreement.     Would benefit from outpatient endo/PCP follow-up.  See brief education below.       Implementation  Nutrition education: Provided education on importance of consistently monitoring BG.    Collaboration and Referral of Nutrition care: Discussed POC with team during rounds.      Nutrition Goals  BG <200 during review period.      MONITORING AND EVALUATION:  Progress towards goals will be monitored and evaluated per protocol and Practice Guidelines        Radhika Beatty RD, LD  Clinical Dietitian  3rd floor/ICU: [phone]  All other floors: [phone]  Weekend/holiday: [phone]

## 2018-07-06 NOTE — PROGRESS NOTES
Pre-procedure:  Are you having any pain or shortness of breath (prior to starting)? no  Initial vital signs: /94, HR 90, RR 14  Allergies reviewed: yes   Rhythm: Sinus  Medications taken within 48 hours of procedure: see epic   Last Caffeine: nothing today  Lung sounds: CTA, no wheezing, crackles or rtx  Health History (COPD, Asthma, etc): none           Procedure: Lexiscan  Reaction/symptoms after receiving Kaila injection: Shortness of breath, nausea  Intensity of Pain:   Rhythm: sinus tachycardia  1. Vital Signs:/98, , RR 15  2. Vital Signs:/96, , RR 15     Reversal agent: N/A    Post:   Resolution of symptoms?: YES  Vital signs: /92, , RR 14  Vital signs: /85, , RR 14  Rhythm: sinus  Walk: NO  Comment: No chest pain or shortness of breath at this time.  Return to Radiology

## 2018-07-06 NOTE — DISCHARGE SUMMARY
Discharge Summary  Hospitalist Service    Mary Veliz MRN# [id number]   YOB: 1975 Age: 43 year old     Date of Admission:  7/4/2018  Date of Discharge:  7/6/2018  Admitting Physician:  Rhoda Mejia MD  Discharge Physician: Alec Verduzco DO  Discharging Service: Hospitalist Service     Primary Provider: Jessi Garcia  Primary Care Physician Phone Number: [phone]         Discharge Diagnoses/Problem Oriented Hospital Course (Providers):    Mary Veliz was admitted on 7/4/2018 by Rhoda Mejia MD and I would refer you to their history and physical.  The following problems were addressed during her hospitalization:  1. Diabetic ketoacidosis.  Ketoacidosis has resolved with continuous insulin drip and IV fluids.  Continue Lantus insulin and Lispro.  2. Chest pain.  Serial troponins unremarkable.  Lexiscan cardiac stress test done on 7/6/18 shows no evidence of ischemia.  Continue aspirin 81 mg a day.   3. Hypokalemia.  Resolved with potassium replacement protocol.  4. Toothache.  Suspect possible underlying dental infection.  Continue amoxicillin.  Needs to follow-up with dentist after discharge as soon as possible.  5. Seizure disorder.  Continue Keppra.  6. Lactic acidosis.  Suspect that this was due to diabetic ketoacidosis.  Resolved with IV fluids.         Code Status:      Full Code        Pending Results:        Unresulted Labs Ordered in the Past 30 Days of this Admission     Date and Time Order Name Status Description    7/4/2018 1122 Blood culture Preliminary     7/4/2018 1122 Blood culture Preliminary             Discharge Instructions and Follow-Up:      Follow-up Appointments     Follow-up and recommended labs and tests        Follow up with primary care provider, JESSI GARCIA, within 7   days for hospital follow- up.  The following labs/tests are recommended:   BMP in 7 days.                      Discharge Disposition:      Discharged to home          "Discharge Medications:        Current Discharge Medication List      START taking these medications    Details   amoxicillin (AMOXIL) 500 MG capsule Take 1 capsule (500 mg) by mouth 3 times daily for 7 days  Qty: 21 capsule, Refills: 0    Associated Diagnoses: Diabetic ketoacidosis without coma associated with type 1 diabetes mellitus (H)         CONTINUE these medications which have CHANGED    Details   insulin glargine (LANTUS SOLOSTAR) 100 UNIT/ML pen Inject 20 Units Subcutaneous At Bedtime  Qty: 6 mL, Refills: 0    Associated Diagnoses: Diabetic ketoacidosis without coma associated with type 1 diabetes mellitus (H)         CONTINUE these medications which have NOT CHANGED    Details   Acetaminophen (TYLENOL PO) Take 1,000 mg by mouth every 6 hours as needed for mild pain or fever       BABY ASPIRIN PO Take 1 capsule by mouth every evening       insulin lispro (HUMALOG KWIKPEN 100 UNIT/ML SC SOLN) 100 UNIT/ML injection Inject Subcutaneous 4 times daily (with meals and nightly) 3 units/carb unit      levETIRAcetam (KEPPRA) 250 MG tablet TAKE 1 TABLET(250 MG) BY MOUTH TWICE DAILY  Qty: 60 tablet, Refills: 11    Associated Diagnoses: Recurrent seizures (H)               Allergies:         Allergies   Allergen Reactions     Codeine Sulfate      Hydrocodone-Acetaminophen Other (See Comments)     PN:  Itching, Pruritis           Condition and Physical on Discharge:      Discharge condition: Stable   Vitals: Blood pressure 132/87, temperature 98.5  F (36.9  C), temperature source Oral, resp. rate 16, height 1.588 m (5' 2.5\"), weight 66.9 kg (147 lb 7.8 oz), last menstrual period 06/13/2018, SpO2 100 %, not currently breastfeeding.   Gen:  NAD, A&Ox3.  Eyes:  PERRL, sclera anicteric.  OP:  MMM, no lesions.  Neck:  Supple.  CV:  Regular, no murmurs.  Lung:  CTA b/l, normal effort.  Ab:  +BS, soft.  Skin:  Warm, dry to touch.  No rash.  Ext:  No pitting edema LE b/l.        Discharge Time:      I spent 35 minutes with Ms. " Meryl and working on discharge on 7/6/18.        Image Results From This Hospital Stay (For Non-EPIC Providers):        Results for orders placed or performed during the hospital encounter of 07/04/18   XR Chest 2 Views    Narrative    CHEST TWO VIEWS    7/4/2018 11:43 AM     HISTORY: Chest pain.    COMPARISON: 4/15/2009.     FINDINGS: Cardiomediastinal silhouette within normal limits. No focal  airspace opacities. No pleural effusion. No pneumothorax identified.  Mild degenerative changes in the midthoracic spine.       Impression    IMPRESSION: No acute cardiopulmonary abnormalities.    IHSAN SEGURA MD   NM MPI w Lexiscan    Narrative    GATED MYOCARDIAL PERFUSION SCINTIGRAPHY WITH INTRAVENOUS PHARMACOLOGIC  VASODILATATION LEXISCAN -ONE DAY STUDY     7/6/2018 9:46 AM  ARELY AMANUEL MERYL  43 years  Female  1975.    Indication/Clinical History: Chest pain    Impression  1.  Myocardial perfusion imaging using single isotope technique  demonstrated normal myocardial perfusion.   2. Gated images demonstrated normal wall motion and normal left  ventricular chamber size.  The left ventricular systolic function is  normal, with an ejection fraction measured at greater than 80%.  3. This is a normal study with normal perfusion and normal systolic  function. No previous study available for comparison    Procedure  Pharmacologic stress testing was performed with Lexiscan at a rate of  0.08 mg/ml rapid bolus injection, for 15 seconds, 0.4 mg/5ml  intravenously. Low-level exercise was not performed along with the  vasodilator infusion.  The heart rate was 90 at baseline and yohan to  144 beats per minute during the Lexiscan infusion. The rest blood  pressure was 137/94 mmHg and was 164/96 mm Hg during Lexiscan  infusion. The patient experienced warmth and nausea  during the test.    Myocardial perfusion imaging was performed at rest, approximately 45  minutes after the injection intravenously of 11.0 mCi of  Tc-99m  Myoview. At peak pharmacologic effect, 10-20 seconds after Lexiscan,   the patient was injected intravenously with 32.4 mCi of  Tc-99m  Myoview. The post-stress tomographic imaging was performed  approximately 60 minutes after stress.    EKG Findings  The resting EKG demonstrated normal sinus rhythm with nonspecific  diffuse T-wave flattening. The stress EKG demonstrated no ischemic  changes.    Tomographic Findings  Overall, the study quality is excellent . On the stress images,  perfusion is normal. On the rest images, perfusion is normal . Gated  images demonstrated normal wall motion and normal left ventricular  chamber size. The left ventricular ejection fraction was calculated to  be greater than 80%. TID was absent.    BRANDYN MARQUEZ MD           Most Recent Lab Results In EPIC (For Non-EPIC Providers):    Most Recent 3 CBC's:  Recent Labs   Lab Test  07/05/18   0603  07/04/18   1035  11/08/16   2245   WBC  10.5  19.0*  17.2*   HGB  12.5  14.3  13.3   MCV  92  92  92   PLT  229  292  265      Most Recent 3 BMP's:  Recent Labs   Lab Test  07/06/18   0645  07/05/18   0603  07/04/18   2345   NA  138  139  139   POTASSIUM  4.0  3.7  3.3*   CHLORIDE  106  111*  110*   CO2  23  21  21   BUN  13  6*  10   CR  0.62  0.53  0.59   ANIONGAP  9  7  8   JILLIAN  8.2*  7.7*  7.7*   GLC  206*  123*  114*     Most Recent 3 Troponin's:No lab results found.  Most Recent 3 INR's:No lab results found.  Most Recent 2 LFT's:  Recent Labs   Lab Test  07/06/18   0645  02/23/13   0356   AST  12  20   ALT  18  28   ALKPHOS  59  84   BILITOTAL  0.8  0.5     Most Recent Cholesterol Panel:  Recent Labs   Lab Test  07/06/18   0645   CHOL  150   LDL  75   HDL  59   TRIG  81     Most Recent 6 Bacteria Isolates From Any Culture (See EPIC Reports for Culture Details):  Recent Labs   Lab Test  07/04/18   1226  07/04/18   1147   CULT  No growth after 2 days  No growth after 2 days     Most Recent TSH, T4 and HgbA1c:   Recent Labs   Lab Test   07/04/18   1035   A1C  8.9*

## 2018-07-06 NOTE — PLAN OF CARE
Problem: Patient Care Overview  Goal: Plan of Care/Patient Progress Review  Outcome: Adequate for Discharge Date Met: 07/06/18  Pt verbalized receipt of all belongings, medications, and d/c instructions. Pt refused w/c to lobby and ambulated with family.

## 2018-07-06 NOTE — PLAN OF CARE
Denies discomfort.  Ate late bkft due to lexiscan. 282 before bkft. Refused correction scale and only did carb  Counting .  Refuses lunch with late bkft. Napping this afternoon.  Tele sr

## 2018-07-10 LAB
BACTERIA SPEC CULT: NO GROWTH
BACTERIA SPEC CULT: NO GROWTH
Lab: NORMAL
Lab: NORMAL
SPECIMEN SOURCE: NORMAL
SPECIMEN SOURCE: NORMAL

## 2019-03-27 DIAGNOSIS — G40.909 RECURRENT SEIZURES (H): ICD-10-CM

## 2019-03-28 RX ORDER — LEVETIRACETAM 250 MG/1
TABLET ORAL
Qty: 60 TABLET | Refills: 0 | Status: SHIPPED | OUTPATIENT
Start: 2019-03-28 | End: 2019-05-01

## 2019-03-28 NOTE — TELEPHONE ENCOUNTER
Medication request meets [place] Medication Refill Protocol - Seizure Medications (non-controlled) requirements for a supply to last a wayne period of 30 days. Letter sent to patient requesting she schedule a return appt asap.

## 2019-04-27 DIAGNOSIS — G40.909 RECURRENT SEIZURES (H): ICD-10-CM

## 2019-04-30 RX ORDER — LEVETIRACETAM 250 MG/1
TABLET ORAL
Qty: 60 TABLET | Refills: 0 | OUTPATIENT
Start: 2019-04-30

## 2019-05-01 DIAGNOSIS — G40.909 RECURRENT SEIZURES (H): ICD-10-CM

## 2019-05-02 RX ORDER — LEVETIRACETAM 250 MG/1
250 TABLET ORAL 2 TIMES DAILY
Qty: 60 TABLET | Refills: 0 | Status: SHIPPED | OUTPATIENT
Start: 2019-05-02 | End: 2019-05-10

## 2019-05-05 DIAGNOSIS — G40.909 RECURRENT SEIZURES (H): ICD-10-CM

## 2019-05-06 RX ORDER — LEVETIRACETAM 250 MG/1
TABLET ORAL
Qty: 60 TABLET | Refills: 0 | OUTPATIENT
Start: 2019-05-06

## 2019-05-10 ENCOUNTER — OFFICE VISIT (OUTPATIENT)
Dept: NEUROLOGY | Facility: CLINIC | Age: 44
End: 2019-05-10
Payer: COMMERCIAL

## 2019-05-10 VITALS
DIASTOLIC BLOOD PRESSURE: 84 MMHG | BODY MASS INDEX: 25.88 KG/M2 | WEIGHT: 143.8 LBS | HEART RATE: 93 BPM | SYSTOLIC BLOOD PRESSURE: 153 MMHG

## 2019-05-10 DIAGNOSIS — G40.909 RECURRENT SEIZURES (H): ICD-10-CM

## 2019-05-10 RX ORDER — LEVETIRACETAM 250 MG/1
250 TABLET ORAL 2 TIMES DAILY
Qty: 180 TABLET | Refills: 3 | Status: SHIPPED | OUTPATIENT
Start: 2019-05-10

## 2019-05-10 ASSESSMENT — PAIN SCALES - GENERAL: PAINLEVEL: NO PAIN (0)

## 2019-05-10 NOTE — LETTER
"5/10/2019     RE: Mary Veliz  : 1975   MRN: [id number]      Dear Colleague,    Thank you for referring your patient, Mary Veliz, to the [facility] EPILEPSY [facility] at [facility]. Please see a copy of my visit note below.    INTERVAL HX: No more spells. Tolerating 250 bid of LEV well. Major issue continues to be diabetes, spinal stenosis,  HISTORY OF PRESENT ILLNESS: REV 5/10/19. The most recent spell occurred on 16 She was in a store shopping with friends.  She stopped outside of the store and began to feel that she was talking in slow motion and that her friends were also talking in slow motion.  She sat down, feeling dizzy; friends drove her home.  When she arrived at home, she started having some unusual spells where she began to shake her left hand, and she demonstrated this in clinic as a rapid back and forth movement of the hand.  She then began to clap hands, arms were twisting.  She was fully aware of these events and she said \"I couldn't stop them and they scared me.\"  This went on and off for about an hour or so before the paramedics arrived.  She did check her glucose which was 452 at the time.  EMS gave her some sedation, but that did not appear to change her symptoms.    the emergency room, she was noted to have a normal blood pressure and intermittent \"tremors of her extremities.\"  She was able to stop these when performing purposeful movements in the emergency room.  Ativan was given and she had no further events after that.  She returned home and did have vomiting and then tiredness for the next day or 2.      Emergency room or laboratories confirmed glucose that was elevated at 339.  CT of the brain without contrast was negative.      In exploring her history, she states that when she was approximately 27, she began to have spells approximately once every year or 2.  Often these began with nausea, dizziness and she sometimes ends up on the " ground.  Two years ago she had an event where she ended up on the ground and did have urinary incontinence.      The spell she had on 11/08, however, was somewhat different in as much as she did not lose awareness.  There are no precipitating events for these.  These have never been diagnosed as epileptic seizures, but Dr. Steinberg did raise that possibility.  There is no family history of epilepsy.      Mother confirmed birth weight of approximately 8 pounds 3 ounces.      Developmental was normal.  No febrile seizures.  No encephalitis or meningitis.  No severe head injuries.      She had a routine EEG at the [facility] that was done on 11/16/2016 as interpreted as borderline with presence of only asymmetric theta activity in the left temporal region of unclear clinical significance.      MRI scan on 11/2316  at [facility] did not state the strength of the MRI machine, but was basically FLARE, DWI and other images and was read as subtle asymmetric decrease in the left hippocampal volume with associated intrinsic T2 prolongation, suspicious for left hippocampal sclerosis.  She is currently on no anti-seizure medication.      She had juvenile-onset diabetes at age 17, treated with Humalog and Lantus.  Her diabetes is very poorly controlled with she reports blood sugars as high as 700 without symptoms.  Apparently she has been deemed not a good candidate for an insulin pump, but has been [facility] and [facility] by [facility].      ALLERGIES:  Codeine which causes severe itching.      MEDICAL HISTORY:  Type 1 diabetes, onset age 17.      SURGERIES:  Included 2 C-sections with the birth of children, back surgery for spinal stenosis, tubal ligation, arthroscopic knee surgery, left knee.      FAMILY HISTORY:  Positive for cancer, heart disease in parents and hypertension and heart attacks in father.        Currently she is disabled due to cervical spinal stenosis, which has made it impossible for her  to do any significant lifting, etc.      She previously did work in  in an office and in retail as well.      Two children ages 19 and 11.        She describes herself generally as a happy, pleasant person that, however, feels socially isolated.  Her  does work.  She does go to [employer] on Sundays.  She has had a previous history of mild depression treated but currently does not describe any issues with depression.      REVIEW OF SYSTEMS:  Weakness of upper arms and wrists, spinal stenosis including paresthesias.      Currently not driving but often drives around the neighborhood a few blocks to go to [employer], shopping and visit parents.      Otherwise, cardiovascular, gastrointestinal and HEENT are negative.  She does have occasional anxiety and difficulty sleeping.      She is a smoker.      PHYSICAL EXAMINATION:   GENERAL:  On exam today, she is quite alert, speaks fluently and actually has a normal affect.   CHEST:  Examination of the lungs reveals some rales or rhonchi initially on deep breathing, which do clear.  NEUROLOGICAL EXAMINATION:  Memory is mildly impaired with 2 items out of 3 at 5 minutes.   CRANIAL NERVES:  Pupils are equal and react briskly.  Visual fields are grossly normal.  Facial movements are symmetrical.  Hearing is intact.  Tongue and palate move well.   CEREBELLAR EXAMINATION:  Finger-nose-finger is done without dysmetria.  Romberg is negative.  Tandem gait is done with only minimal incoordination.     MOTOR TONE AND STRENGTH:  Normal, though she does have some mild difficulty with hopping on either foot.     REFLEXES:  Deep tendon reflexes are hypoactive in the lower extremities.     SENSATION:  She does have loss of sensation to pin and sharp and dull.  Has decreased vibration sense, but position sense is grossly normal.      ASSESSMENT:  Electroencephalogram  done Nov 2016  shows relatively frequent sharp waves in the left temporal region, and supports EEG from [facility]. Also MRI findings of L temp issues. DIagnosis is  Presumptive CP SZ but diff Dx includew diabetic events & PSNEs. She is happy with LEV. On lowest therapeutic dose. Also may have early renal impairment that would support need for lower dose.      PLAN:  1) Given phone # for [facility] transplant center  2) RTC 1 year    Again, thank you for allowing me to participate in the care of your patient.      Sincerely,    Bill Munoz MD

## 2019-05-10 NOTE — PROGRESS NOTES
"INTERVAL HX: No more spells. Tolerating 250 bid of LEV well. Major issue continues to be diabetes, spinal stenosis,  HISTORY OF PRESENT ILLNESS: REV 5/10/19. The most recent spell occurred on 11/08/16 She was in a store shopping with friends.  She stopped outside of the store and began to feel that she was talking in slow motion and that her friends were also talking in slow motion.  She sat down, feeling dizzy; friends drove her home.  When she arrived at home, she started having some unusual spells where she began to shake her left hand, and she demonstrated this in clinic as a rapid back and forth movement of the hand.  She then began to clap hands, arms were twisting.  She was fully aware of these events and she said \"I couldn't stop them and they scared me.\"  This went on and off for about an hour or so before the paramedics arrived.  She did check her glucose which was 452 at the time.  EMS gave her some sedation, but that did not appear to change her symptoms.    the emergency room, she was noted to have a normal blood pressure and intermittent \"tremors of her extremities.\"  She was able to stop these when performing purposeful movements in the emergency room.  Ativan was given and she had no further events after that.  She returned home and did have vomiting and then tiredness for the next day or 2.      Emergency room or laboratories confirmed glucose that was elevated at 339.  CT of the brain without contrast was negative.      In exploring her history, she states that when she was approximately 27, she began to have spells approximately once every year or 2.  Often these began with nausea, dizziness and she sometimes ends up on the ground.  Two years ago she had an event where she ended up on the ground and did have urinary incontinence.      The spell she had on 11/08, however, was somewhat different in as much as she did not lose awareness.  There are no precipitating events for these.  These have never " been diagnosed as epileptic seizures, but Dr. Steinberg did raise that possibility.  There is no family history of epilepsy.      Mother confirmed birth weight of approximately 8 pounds 3 ounces.      Developmental was normal.  No febrile seizures.  No encephalitis or meningitis.  No severe head injuries.      She had a routine EEG at the [facility] that was done on 11/16/2016 as interpreted as borderline with presence of only asymmetric theta activity in the left temporal region of unclear clinical significance.      MRI scan on 11/2316  at [facility] did not state the strength of the MRI machine, but was basically FLARE, DWI and other images and was read as subtle asymmetric decrease in the left hippocampal volume with associated intrinsic T2 prolongation, suspicious for left hippocampal sclerosis.  She is currently on no anti-seizure medication.      She had juvenile-onset diabetes at age 17, treated with Humalog and Lantus.  Her diabetes is very poorly controlled with she reports blood sugars as high as 700 without symptoms.  Apparently she has been deemed not a good candidate for an insulin pump, but has been [facility] and [facility].      ALLERGIES:  Codeine which causes severe itching.      MEDICAL HISTORY:  Type 1 diabetes, onset age 17.      SURGERIES:  Included 2 C-sections with the birth of children, back surgery for spinal stenosis, tubal ligation, arthroscopic knee surgery, left knee.      FAMILY HISTORY:  Positive for cancer, heart disease in parents and hypertension and heart attacks in father.        Currently she is disabled due to cervical spinal stenosis, which has made it impossible for her to do any significant lifting, etc.      She previously did work in  in an office and in retail as well.      Two children ages 19 and 11.        She describes herself generally as a happy, pleasant person that, however, feels socially isolated.  Her  does work.   She does go to [employer] on Sundays.  She has had a previous history of mild depression treated but currently does not describe any issues with depression.      REVIEW OF SYSTEMS:  Weakness of upper arms and wrists, spinal stenosis including paresthesias.      Currently not driving but often drives around the neighborhood a few blocks to go to [employer], shopping and visit parents.      Otherwise, cardiovascular, gastrointestinal and HEENT are negative.  She does have occasional anxiety and difficulty sleeping.      She is a smoker.      PHYSICAL EXAMINATION:   GENERAL:  On exam today, she is quite alert, speaks fluently and actually has a normal affect.   CHEST:  Examination of the lungs reveals some rales or rhonchi initially on deep breathing, which do clear.  NEUROLOGICAL EXAMINATION:  Memory is mildly impaired with 2 items out of 3 at 5 minutes.   CRANIAL NERVES:  Pupils are equal and react briskly.  Visual fields are grossly normal.  Facial movements are symmetrical.  Hearing is intact.  Tongue and palate move well.   CEREBELLAR EXAMINATION:  Finger-nose-finger is done without dysmetria.  Romberg is negative.  Tandem gait is done with only minimal incoordination.     MOTOR TONE AND STRENGTH:  Normal, though she does have some mild difficulty with hopping on either foot.     REFLEXES:  Deep tendon reflexes are hypoactive in the lower extremities.     SENSATION:  She does have loss of sensation to pin and sharp and dull.  Has decreased vibration sense, but position sense is grossly normal.      ASSESSMENT:  Electroencephalogram  done Nov 2016  shows relatively frequent sharp waves in the left temporal region, and supports EEG from [facility]. Also MRI findings of L temp issues. DIagnosis is  Presumptive CP SZ but diff Dx includew diabetic events & PSNEs. She is happy with LEV. On lowest therapeutic dose. Also may have early renal impairment that would support need for lower dose.      PLAN:  1) Given phone #  for [facility]  2) RTC 1 year

## 2022-02-17 PROBLEM — E11.10 DKA (DIABETIC KETOACIDOSIS) (H): Status: ACTIVE | Noted: 2018-07-04
